# Patient Record
Sex: MALE | Race: WHITE | Employment: OTHER | URBAN - NONMETROPOLITAN AREA
[De-identification: names, ages, dates, MRNs, and addresses within clinical notes are randomized per-mention and may not be internally consistent; named-entity substitution may affect disease eponyms.]

---

## 2017-03-06 ENCOUNTER — OFFICE VISIT (OUTPATIENT)
Dept: FAMILY MEDICINE CLINIC | Age: 67
End: 2017-03-06

## 2017-03-06 VITALS
DIASTOLIC BLOOD PRESSURE: 80 MMHG | HEART RATE: 63 BPM | RESPIRATION RATE: 18 BRPM | WEIGHT: 186.2 LBS | BODY MASS INDEX: 25.97 KG/M2 | OXYGEN SATURATION: 92 % | SYSTOLIC BLOOD PRESSURE: 112 MMHG

## 2017-03-06 DIAGNOSIS — M16.12 PRIMARY OSTEOARTHRITIS OF LEFT HIP: Primary | ICD-10-CM

## 2017-03-06 DIAGNOSIS — Z01.818 PRE-OPERATIVE CLEARANCE: ICD-10-CM

## 2017-03-06 DIAGNOSIS — R93.89 ABNORMAL CHEST X-RAY: ICD-10-CM

## 2017-03-06 PROCEDURE — 99214 OFFICE O/P EST MOD 30 MIN: CPT | Performed by: FAMILY MEDICINE

## 2017-03-06 PROCEDURE — 1036F TOBACCO NON-USER: CPT | Performed by: FAMILY MEDICINE

## 2017-03-06 PROCEDURE — 1123F ACP DISCUSS/DSCN MKR DOCD: CPT | Performed by: FAMILY MEDICINE

## 2017-03-06 PROCEDURE — G8427 DOCREV CUR MEDS BY ELIG CLIN: HCPCS | Performed by: FAMILY MEDICINE

## 2017-03-06 PROCEDURE — G8420 CALC BMI NORM PARAMETERS: HCPCS | Performed by: FAMILY MEDICINE

## 2017-03-06 PROCEDURE — G8484 FLU IMMUNIZE NO ADMIN: HCPCS | Performed by: FAMILY MEDICINE

## 2017-03-06 PROCEDURE — 4040F PNEUMOC VAC/ADMIN/RCVD: CPT | Performed by: FAMILY MEDICINE

## 2017-03-06 PROCEDURE — 3017F COLORECTAL CA SCREEN DOC REV: CPT | Performed by: FAMILY MEDICINE

## 2017-03-06 ASSESSMENT — ENCOUNTER SYMPTOMS
ABDOMINAL PAIN: 0
CONSTIPATION: 0
COUGH: 0
NAUSEA: 0
EYE DISCHARGE: 0
RHINORRHEA: 0
SHORTNESS OF BREATH: 0
DIARRHEA: 0
WHEEZING: 0
SORE THROAT: 0

## 2018-04-10 ENCOUNTER — OFFICE VISIT (OUTPATIENT)
Dept: FAMILY MEDICINE CLINIC | Age: 68
End: 2018-04-10
Payer: MEDICARE

## 2018-04-10 VITALS
BODY MASS INDEX: 26.4 KG/M2 | SYSTOLIC BLOOD PRESSURE: 120 MMHG | HEART RATE: 79 BPM | OXYGEN SATURATION: 97 % | WEIGHT: 188.6 LBS | HEIGHT: 71 IN | DIASTOLIC BLOOD PRESSURE: 76 MMHG

## 2018-04-10 DIAGNOSIS — M15.9 PRIMARY OSTEOARTHRITIS INVOLVING MULTIPLE JOINTS: Primary | ICD-10-CM

## 2018-04-10 DIAGNOSIS — Z23 NEED FOR VACCINATION: ICD-10-CM

## 2018-04-10 PROCEDURE — 1036F TOBACCO NON-USER: CPT | Performed by: FAMILY MEDICINE

## 2018-04-10 PROCEDURE — 3288F FALL RISK ASSESSMENT DOCD: CPT | Performed by: FAMILY MEDICINE

## 2018-04-10 PROCEDURE — G0009 ADMIN PNEUMOCOCCAL VACCINE: HCPCS | Performed by: FAMILY MEDICINE

## 2018-04-10 PROCEDURE — 90670 PCV13 VACCINE IM: CPT | Performed by: FAMILY MEDICINE

## 2018-04-10 PROCEDURE — 1123F ACP DISCUSS/DSCN MKR DOCD: CPT | Performed by: FAMILY MEDICINE

## 2018-04-10 PROCEDURE — 3017F COLORECTAL CA SCREEN DOC REV: CPT | Performed by: FAMILY MEDICINE

## 2018-04-10 PROCEDURE — G8510 SCR DEP NEG, NO PLAN REQD: HCPCS | Performed by: FAMILY MEDICINE

## 2018-04-10 PROCEDURE — 99214 OFFICE O/P EST MOD 30 MIN: CPT | Performed by: FAMILY MEDICINE

## 2018-04-10 PROCEDURE — G8427 DOCREV CUR MEDS BY ELIG CLIN: HCPCS | Performed by: FAMILY MEDICINE

## 2018-04-10 PROCEDURE — 4040F PNEUMOC VAC/ADMIN/RCVD: CPT | Performed by: FAMILY MEDICINE

## 2018-04-10 PROCEDURE — G8419 CALC BMI OUT NRM PARAM NOF/U: HCPCS | Performed by: FAMILY MEDICINE

## 2018-04-10 RX ORDER — CELECOXIB 200 MG/1
200 CAPSULE ORAL 2 TIMES DAILY
Qty: 180 CAPSULE | Refills: 3 | Status: SHIPPED | OUTPATIENT
Start: 2018-04-10 | End: 2018-04-16

## 2018-04-10 ASSESSMENT — ENCOUNTER SYMPTOMS
SORE THROAT: 0
ABDOMINAL PAIN: 0
SHORTNESS OF BREATH: 0
EYE DISCHARGE: 0
COUGH: 0
NAUSEA: 0
CONSTIPATION: 0
DIARRHEA: 0
RHINORRHEA: 0
WHEEZING: 0

## 2018-04-10 ASSESSMENT — PATIENT HEALTH QUESTIONNAIRE - PHQ9
SUM OF ALL RESPONSES TO PHQ QUESTIONS 1-9: 0
1. LITTLE INTEREST OR PLEASURE IN DOING THINGS: 0
SUM OF ALL RESPONSES TO PHQ9 QUESTIONS 1 & 2: 0

## 2018-04-16 ENCOUNTER — TELEPHONE (OUTPATIENT)
Dept: FAMILY MEDICINE CLINIC | Age: 68
End: 2018-04-16

## 2018-04-16 RX ORDER — MELOXICAM 15 MG/1
15 TABLET ORAL DAILY
Qty: 30 TABLET | Refills: 3 | Status: SHIPPED | OUTPATIENT
Start: 2018-04-16 | End: 2018-08-09 | Stop reason: SDUPTHER

## 2018-08-09 RX ORDER — MELOXICAM 15 MG/1
15 TABLET ORAL DAILY
Qty: 30 TABLET | Refills: 3 | Status: SHIPPED | OUTPATIENT
Start: 2018-08-09 | End: 2018-12-06 | Stop reason: SDUPTHER

## 2018-08-09 NOTE — TELEPHONE ENCOUNTER
Vanessa Vines called requesting a refill on the following medications:  Requested Prescriptions     Pending Prescriptions Disp Refills    meloxicam (MOBIC) 15 MG tablet 30 tablet 3     Sig: Take 1 tablet by mouth daily     Pharm[facility]: [facility]      Date of last visit: 4/10/18  Date of next visit (if applicable): Visit date not found    Asking for a 80 day supply please

## 2018-12-06 RX ORDER — MELOXICAM 15 MG/1
15 TABLET ORAL DAILY
Qty: 90 TABLET | Refills: 3 | Status: SHIPPED | OUTPATIENT
Start: 2018-12-06 | End: 2019-12-12 | Stop reason: SDUPTHER

## 2019-06-13 ENCOUNTER — OFFICE VISIT (OUTPATIENT)
Dept: FAMILY MEDICINE CLINIC | Age: 69
End: 2019-06-13
Payer: MEDICARE

## 2019-06-13 VITALS
RESPIRATION RATE: 16 BRPM | BODY MASS INDEX: 25.48 KG/M2 | HEART RATE: 61 BPM | SYSTOLIC BLOOD PRESSURE: 130 MMHG | OXYGEN SATURATION: 97 % | DIASTOLIC BLOOD PRESSURE: 80 MMHG | HEIGHT: 70 IN | WEIGHT: 178 LBS

## 2019-06-13 DIAGNOSIS — M25.511 ACUTE PAIN OF RIGHT SHOULDER: Primary | ICD-10-CM

## 2019-06-13 PROCEDURE — 3017F COLORECTAL CA SCREEN DOC REV: CPT | Performed by: NURSE PRACTITIONER

## 2019-06-13 PROCEDURE — 4040F PNEUMOC VAC/ADMIN/RCVD: CPT | Performed by: NURSE PRACTITIONER

## 2019-06-13 PROCEDURE — G8419 CALC BMI OUT NRM PARAM NOF/U: HCPCS | Performed by: NURSE PRACTITIONER

## 2019-06-13 PROCEDURE — 1036F TOBACCO NON-USER: CPT | Performed by: NURSE PRACTITIONER

## 2019-06-13 PROCEDURE — G8427 DOCREV CUR MEDS BY ELIG CLIN: HCPCS | Performed by: NURSE PRACTITIONER

## 2019-06-13 PROCEDURE — 99213 OFFICE O/P EST LOW 20 MIN: CPT | Performed by: NURSE PRACTITIONER

## 2019-06-13 PROCEDURE — 1123F ACP DISCUSS/DSCN MKR DOCD: CPT | Performed by: NURSE PRACTITIONER

## 2019-06-13 ASSESSMENT — ENCOUNTER SYMPTOMS
BACK PAIN: 0
COLOR CHANGE: 0
SHORTNESS OF BREATH: 0

## 2019-06-13 ASSESSMENT — PATIENT HEALTH QUESTIONNAIRE - PHQ9
2. FEELING DOWN, DEPRESSED OR HOPELESS: 0
1. LITTLE INTEREST OR PLEASURE IN DOING THINGS: 0
SUM OF ALL RESPONSES TO PHQ QUESTIONS 1-9: 0
SUM OF ALL RESPONSES TO PHQ9 QUESTIONS 1 & 2: 0
SUM OF ALL RESPONSES TO PHQ QUESTIONS 1-9: 0

## 2019-06-13 NOTE — PROGRESS NOTES
[facility]  [address] [place] [address]  Dept: [phone]  Dept Fax: [phone]  Loc: [phone]    Amanda Johnson is a 77 y.o. male who presents today for his medical conditions/complaints as noted below. Chief Complaint   Patient presents with    Shoulder Pain     RIGHT-patient states that he was leaning on his shoulder and reached down to get something and felt a pop on Sunday 06/09/19           HPI:     This is 71-year-old male who presents with right should pain x 5 days. Patient was putting pressure on her right shoulder and bend over, he heard a pop in his right shoulder. Pain is worse when he raises his arm. Pain is about a 6-7/10 when raising his arm or when trying to lift heavy items. Pain is absent with rest. No treatment tried. Pain does not radiate. Patient had a rotator cuff surgery on his rotator cuff years ago. Otherwise patient feels well with no other complaints. Current Outpatient Medications   Medication Sig Dispense Refill    meloxicam (MOBIC) 15 MG tablet Take 1 tablet by mouth daily 90 tablet 3     No current facility-administered medications for this visit. No Known Allergies    Subjective:      Review of Systems   Constitutional: Negative for activity change, appetite change, chills, diaphoresis, fatigue, fever and unexpected weight change. HENT: Negative for congestion, ear pain, postnasal drip, sinus pressure and sore throat. Eyes: Negative for visual disturbance. Respiratory: Negative for cough, chest tightness, shortness of breath, wheezing and stridor. Cardiovascular: Negative for chest pain, palpitations and leg swelling. Gastrointestinal: Negative for abdominal distention, abdominal pain, constipation, diarrhea, nausea and vomiting. Endocrine: Negative for polydipsia, polyphagia and polyuria.    Genitourinary: Negative for decreased urine volume, difficulty Standing Expiration Date:   6/13/2020     No orders of the defined types were placed in this encounter.           Electronically signed by CLARISSA Bowen CNP on 6/13/2019 at 2:57 PM

## 2019-06-14 ENCOUNTER — HOSPITAL ENCOUNTER (OUTPATIENT)
Dept: MRI IMAGING | Age: 69
Discharge: HOME | End: 2019-06-14
Payer: MEDICARE

## 2019-06-14 DIAGNOSIS — M25.511 ACUTE PAIN OF RIGHT SHOULDER: ICD-10-CM

## 2019-06-14 PROCEDURE — 73221 MRI JOINT UPR EXTREM W/O DYE: CPT

## 2019-06-16 ASSESSMENT — ENCOUNTER SYMPTOMS
STRIDOR: 0
COUGH: 0
DIARRHEA: 0
CONSTIPATION: 0
WHEEZING: 0
VOMITING: 0
NAUSEA: 0
SORE THROAT: 0
CHEST TIGHTNESS: 0
ABDOMINAL PAIN: 0
SINUS PRESSURE: 0
ABDOMINAL DISTENTION: 0

## 2019-06-17 DIAGNOSIS — S43.431A TEAR OF RIGHT GLENOID LABRUM, INITIAL ENCOUNTER: ICD-10-CM

## 2019-06-17 DIAGNOSIS — S46.011A TRAUMATIC COMPLETE TEAR OF RIGHT ROTATOR CUFF, INITIAL ENCOUNTER: Primary | ICD-10-CM

## 2019-12-12 ENCOUNTER — TELEPHONE (OUTPATIENT)
Dept: FAMILY MEDICINE CLINIC | Age: 69
End: 2019-12-12

## 2019-12-12 ENCOUNTER — OFFICE VISIT (OUTPATIENT)
Dept: FAMILY MEDICINE CLINIC | Age: 69
End: 2019-12-12
Payer: MEDICARE

## 2019-12-12 VITALS
BODY MASS INDEX: 25.34 KG/M2 | HEIGHT: 71 IN | RESPIRATION RATE: 16 BRPM | OXYGEN SATURATION: 97 % | SYSTOLIC BLOOD PRESSURE: 122 MMHG | WEIGHT: 181 LBS | DIASTOLIC BLOOD PRESSURE: 74 MMHG | HEART RATE: 70 BPM

## 2019-12-12 DIAGNOSIS — Z13.1 SCREENING FOR DIABETES MELLITUS: ICD-10-CM

## 2019-12-12 DIAGNOSIS — Z13.0 SCREENING FOR DEFICIENCY ANEMIA: ICD-10-CM

## 2019-12-12 DIAGNOSIS — M25.512 ACUTE PAIN OF LEFT SHOULDER: ICD-10-CM

## 2019-12-12 DIAGNOSIS — Z00.00 ROUTINE GENERAL MEDICAL EXAMINATION AT A HEALTH CARE FACILITY: Primary | ICD-10-CM

## 2019-12-12 DIAGNOSIS — Z13.220 SCREENING FOR LIPID DISORDERS: ICD-10-CM

## 2019-12-12 PROCEDURE — 1123F ACP DISCUSS/DSCN MKR DOCD: CPT | Performed by: NURSE PRACTITIONER

## 2019-12-12 PROCEDURE — 99213 OFFICE O/P EST LOW 20 MIN: CPT | Performed by: NURSE PRACTITIONER

## 2019-12-12 PROCEDURE — G8427 DOCREV CUR MEDS BY ELIG CLIN: HCPCS | Performed by: NURSE PRACTITIONER

## 2019-12-12 PROCEDURE — 4040F PNEUMOC VAC/ADMIN/RCVD: CPT | Performed by: NURSE PRACTITIONER

## 2019-12-12 PROCEDURE — 1036F TOBACCO NON-USER: CPT | Performed by: NURSE PRACTITIONER

## 2019-12-12 PROCEDURE — 3017F COLORECTAL CA SCREEN DOC REV: CPT | Performed by: NURSE PRACTITIONER

## 2019-12-12 PROCEDURE — G8484 FLU IMMUNIZE NO ADMIN: HCPCS | Performed by: NURSE PRACTITIONER

## 2019-12-12 PROCEDURE — G0439 PPPS, SUBSEQ VISIT: HCPCS | Performed by: NURSE PRACTITIONER

## 2019-12-12 PROCEDURE — G8417 CALC BMI ABV UP PARAM F/U: HCPCS | Performed by: NURSE PRACTITIONER

## 2019-12-12 RX ORDER — MELOXICAM 15 MG/1
15 TABLET ORAL DAILY
Qty: 90 TABLET | Refills: 3 | Status: SHIPPED | OUTPATIENT
Start: 2019-12-12 | End: 2020-12-21 | Stop reason: SDUPTHER

## 2019-12-12 SDOH — ECONOMIC STABILITY: INCOME INSECURITY: HOW HARD IS IT FOR YOU TO PAY FOR THE VERY BASICS LIKE FOOD, HOUSING, MEDICAL CARE, AND HEATING?: NOT HARD AT ALL

## 2019-12-12 SDOH — ECONOMIC STABILITY: FOOD INSECURITY: WITHIN THE PAST 12 MONTHS, THE FOOD YOU BOUGHT JUST DIDN'T LAST AND YOU DIDN'T HAVE MONEY TO GET MORE.: NEVER TRUE

## 2019-12-12 SDOH — ECONOMIC STABILITY: TRANSPORTATION INSECURITY
IN THE PAST 12 MONTHS, HAS THE LACK OF TRANSPORTATION KEPT YOU FROM MEDICAL APPOINTMENTS OR FROM GETTING MEDICATIONS?: NO

## 2019-12-12 SDOH — ECONOMIC STABILITY: FOOD INSECURITY: WITHIN THE PAST 12 MONTHS, YOU WORRIED THAT YOUR FOOD WOULD RUN OUT BEFORE YOU GOT MONEY TO BUY MORE.: NEVER TRUE

## 2019-12-12 SDOH — ECONOMIC STABILITY: TRANSPORTATION INSECURITY
IN THE PAST 12 MONTHS, HAS LACK OF TRANSPORTATION KEPT YOU FROM MEETINGS, WORK, OR FROM GETTING THINGS NEEDED FOR DAILY LIVING?: NO

## 2019-12-12 ASSESSMENT — LIFESTYLE VARIABLES
HOW OFTEN DURING THE LAST YEAR HAVE YOU NEEDED AN ALCOHOLIC DRINK FIRST THING IN THE MORNING TO GET YOURSELF GOING AFTER A NIGHT OF HEAVY DRINKING: 0
HOW OFTEN DURING THE LAST YEAR HAVE YOU HAD A FEELING OF GUILT OR REMORSE AFTER DRINKING: 0
HOW MANY STANDARD DRINKS CONTAINING ALCOHOL DO YOU HAVE ON A TYPICAL DAY: 1
HOW OFTEN DO YOU HAVE SIX OR MORE DRINKS ON ONE OCCASION: 2
HOW OFTEN DURING THE LAST YEAR HAVE YOU BEEN UNABLE TO REMEMBER WHAT HAPPENED THE NIGHT BEFORE BECAUSE YOU HAD BEEN DRINKING: 0
AUDIT TOTAL SCORE: 7
HAS A RELATIVE, FRIEND, DOCTOR, OR ANOTHER HEALTH PROFESSIONAL EXPRESSED CONCERN ABOUT YOUR DRINKING OR SUGGESTED YOU CUT DOWN: 0
HOW OFTEN DO YOU HAVE A DRINK CONTAINING ALCOHOL: 4
AUDIT-C TOTAL SCORE: 7
HAVE YOU OR SOMEONE ELSE BEEN INJURED AS A RESULT OF YOUR DRINKING: 0
HOW OFTEN DURING THE LAST YEAR HAVE YOU FOUND THAT YOU WERE NOT ABLE TO STOP DRINKING ONCE YOU HAD STARTED: 0
HOW OFTEN DURING THE LAST YEAR HAVE YOU FAILED TO DO WHAT WAS NORMALLY EXPECTED FROM YOU BECAUSE OF DRINKING: 0

## 2019-12-12 ASSESSMENT — ENCOUNTER SYMPTOMS
CHEST TIGHTNESS: 0
COLOR CHANGE: 0
SHORTNESS OF BREATH: 0
STRIDOR: 0
ABDOMINAL DISTENTION: 0
COUGH: 0
DIARRHEA: 0
CONSTIPATION: 0
VOMITING: 0
BACK PAIN: 0
SORE THROAT: 0
WHEEZING: 0
ABDOMINAL PAIN: 0
NAUSEA: 0
SINUS PRESSURE: 0

## 2019-12-12 ASSESSMENT — PATIENT HEALTH QUESTIONNAIRE - PHQ9
SUM OF ALL RESPONSES TO PHQ QUESTIONS 1-9: 0
SUM OF ALL RESPONSES TO PHQ QUESTIONS 1-9: 0

## 2019-12-16 ENCOUNTER — HOSPITAL ENCOUNTER (OUTPATIENT)
Dept: MRI IMAGING | Age: 69
Discharge: HOME | End: 2019-12-16
Payer: MEDICARE

## 2019-12-16 DIAGNOSIS — S46.012A TRAUMATIC COMPLETE TEAR OF LEFT ROTATOR CUFF, INITIAL ENCOUNTER: Primary | ICD-10-CM

## 2019-12-16 DIAGNOSIS — M25.512 ACUTE PAIN OF LEFT SHOULDER: ICD-10-CM

## 2019-12-16 PROCEDURE — 73221 MRI JOINT UPR EXTREM W/O DYE: CPT

## 2019-12-19 LAB
ABSOLUTE BASO #: 0 X10E9/L (ref 0–0.9)
ABSOLUTE EOS #: 0.1 X10E9/L (ref 0–0.4)
ABSOLUTE LYMPH #: 1.7 X10E9/L (ref 1–3.5)
ABSOLUTE MONO #: 0.3 X10E9/L (ref 0–0.9)
ABSOLUTE NEUT #: 2 X10E9/L (ref 1.5–6.6)
ALBUMIN SERPL-MCNC: 4.5 G/DL (ref 3.2–5.3)
ALK PHOSPHATASE: 68 U/L (ref 39–130)
ALT SERPL-CCNC: 19 U/L (ref 0–40)
ANION GAP SERPL CALCULATED.3IONS-SCNC: 8 MMOL/L (ref 4–12)
AST SERPL-CCNC: 17 U/L (ref 0–41)
BASOPHILS RELATIVE PERCENT: 0.8 %
BILIRUB SERPL-MCNC: 0.5 MG/DL (ref 0.3–1.2)
BUN BLDV-MCNC: 16 MG/DL (ref 5–27)
CALCIUM SERPL-MCNC: 9.4 MG/DL (ref 8.5–10.5)
CHLORIDE BLD-SCNC: 104 MMOL/L (ref 98–109)
CHOLESTEROL/HDL RATIO: 2.8 (ref 1–5)
CHOLESTEROL: 208 MG/DL (ref 150–200)
CO2: 29 MMOL/L (ref 22–32)
CREAT SERPL-MCNC: 0.97 MG/DL (ref 0.6–1.3)
EGFR AFRICAN AMERICAN: >60 ML/MIN/1.73SQ.M
EGFR IF NONAFRICAN AMERICAN: >60 ML/MIN/1.73SQ.M
EOSINOPHILS RELATIVE PERCENT: 3.1 %
GLUCOSE: 88 MG/DL (ref 65–99)
HCT VFR BLD CALC: 43.1 % (ref 37–51)
HDLC SERPL-MCNC: 74 MG/DL
HEMOGLOBIN: 14.5 G/DL (ref 12.6–17.4)
LDL CHOLESTEROL CALCULATED: 123 MG/DL
LDL/HDL RATIO: 1.7
LYMPHOCYTE %: 40.8 %
MCH RBC QN AUTO: 31.4 PG (ref 27–35)
MCHC RBC AUTO-ENTMCNC: 33.7 G/DL (ref 31–36)
MCV RBC AUTO: 93 FL (ref 81–101)
MONOCYTES # BLD: 6.4 %
NEUTROPHILS RELATIVE PERCENT: 48.9 %
PDW BLD-RTO: 12.6 % (ref 11.4–14.3)
PLATELETS: 226 X10E9/L (ref 150–450)
PMV BLD AUTO: 8.4 FL (ref 7–12)
POTASSIUM SERPL-SCNC: 4.4 MMOL/L (ref 3.5–5)
RBC: 4.63 X10E12/L (ref 3.9–5.8)
SODIUM BLD-SCNC: 141 MMOL/L (ref 134–146)
TOTAL PROTEIN: 6.6 G/DL (ref 6–8)
TRIGL SERPL-MCNC: 54 MG/DL (ref 27–150)
VLDLC SERPL CALC-MCNC: 11 MG/DL (ref 0–30)
WBC: 4.1 X10E9/L (ref 4.4–12)

## 2020-01-02 ENCOUNTER — OFFICE VISIT (OUTPATIENT)
Dept: FAMILY MEDICINE CLINIC | Age: 70
End: 2020-01-02
Payer: MEDICARE

## 2020-01-02 VITALS
RESPIRATION RATE: 16 BRPM | HEART RATE: 93 BPM | OXYGEN SATURATION: 96 % | BODY MASS INDEX: 25.27 KG/M2 | WEIGHT: 181.2 LBS | SYSTOLIC BLOOD PRESSURE: 110 MMHG | DIASTOLIC BLOOD PRESSURE: 68 MMHG

## 2020-01-02 PROCEDURE — G8417 CALC BMI ABV UP PARAM F/U: HCPCS | Performed by: NURSE PRACTITIONER

## 2020-01-02 PROCEDURE — 99213 OFFICE O/P EST LOW 20 MIN: CPT | Performed by: NURSE PRACTITIONER

## 2020-01-02 PROCEDURE — G8484 FLU IMMUNIZE NO ADMIN: HCPCS | Performed by: NURSE PRACTITIONER

## 2020-01-02 PROCEDURE — 20610 DRAIN/INJ JOINT/BURSA W/O US: CPT | Performed by: NURSE PRACTITIONER

## 2020-01-02 PROCEDURE — 4040F PNEUMOC VAC/ADMIN/RCVD: CPT | Performed by: NURSE PRACTITIONER

## 2020-01-02 PROCEDURE — 1036F TOBACCO NON-USER: CPT | Performed by: NURSE PRACTITIONER

## 2020-01-02 PROCEDURE — G8427 DOCREV CUR MEDS BY ELIG CLIN: HCPCS | Performed by: NURSE PRACTITIONER

## 2020-01-02 PROCEDURE — 3017F COLORECTAL CA SCREEN DOC REV: CPT | Performed by: NURSE PRACTITIONER

## 2020-01-02 PROCEDURE — 1123F ACP DISCUSS/DSCN MKR DOCD: CPT | Performed by: NURSE PRACTITIONER

## 2020-01-02 RX ORDER — TRIAMCINOLONE ACETONIDE 40 MG/ML
40 INJECTION, SUSPENSION INTRA-ARTICULAR; INTRAMUSCULAR ONCE
Status: COMPLETED | OUTPATIENT
Start: 2020-01-02 | End: 2020-01-02

## 2020-01-02 RX ADMIN — TRIAMCINOLONE ACETONIDE 40 MG: 40 INJECTION, SUSPENSION INTRA-ARTICULAR; INTRAMUSCULAR at 07:53

## 2020-01-02 ASSESSMENT — ENCOUNTER SYMPTOMS
COLOR CHANGE: 0
ABDOMINAL PAIN: 0
CONSTIPATION: 0
DIARRHEA: 0
SHORTNESS OF BREATH: 0
NAUSEA: 0
SINUS PRESSURE: 0
CHEST TIGHTNESS: 0
WHEEZING: 0
SORE THROAT: 0
ABDOMINAL DISTENTION: 0
BACK PAIN: 0
VOMITING: 0
COUGH: 0
STRIDOR: 0

## 2020-01-02 ASSESSMENT — PATIENT HEALTH QUESTIONNAIRE - PHQ9
SUM OF ALL RESPONSES TO PHQ QUESTIONS 1-9: 0
SUM OF ALL RESPONSES TO PHQ9 QUESTIONS 1 & 2: 0
1. LITTLE INTEREST OR PLEASURE IN DOING THINGS: 0
SUM OF ALL RESPONSES TO PHQ QUESTIONS 1-9: 0
2. FEELING DOWN, DEPRESSED OR HOPELESS: 0

## 2020-01-02 NOTE — PROGRESS NOTES
[address] [place] [address]  [address] [place] [address]  Dept: [phone]  Dept Fax: [phone]  Loc: [phone] Carmela Romo is a 72 y.o. male who presents today for his medical conditions/complaints as noted below. Chief Complaint   Patient presents with    Results     MRI results and would like to talk about an injection. surgery cant fix it. would like to control pain somehow           HPI:     HPI    MRI showed tears in left shoulder. States he injured a while ago. mobic helping some maybe. Pain is mild at rest but does increase with certain movements. Would like an injection. Seen surgeon and states can not be fixed. Recommended trying injections and last possible resort a shoulder replacement. History reviewed. No pertinent past medical history. Past Surgical History:   Procedure Laterality Date    ROTATOR CUFF REPAIR Right       and        History reviewed. No pertinent family history. Social History     Tobacco Use    Smoking status: Former Smoker     Packs/day: 0.25     Years: 10.00     Pack years: 2.50     Types: Cigarettes     Last attempt to quit: 11/15/1992     Years since quittin.1    Smokeless tobacco: Never Used   Substance Use Topics    Alcohol use: Yes     Alcohol/week: 20.0 standard drinks     Types: 20 Standard drinks or equivalent per week      Current Outpatient Medications   Medication Sig Dispense Refill    meloxicam (MOBIC) 15 MG tablet Take 1 tablet by mouth daily 90 tablet 3     No current facility-administered medications for this visit.       No Known Allergies    Health Maintenance   Topic Date Due    Hepatitis C screen  1950    DTaP/Tdap/Td vaccine (1 - Tdap) 1962    A1C test (Diabetic or Prediabetic)  2015    Shingles Vaccine (2 of 3) 10/28/2015    Annual Wellness Visit (AWV)  2020    Lipid screen  2024    Colon cancer screen colonoscopy  06/06/2026    Flu vaccine  Completed    Pneumococcal 65+ years Vaccine  Completed    AAA screen  Completed       Subjective:      Review of Systems   Constitutional: Negative for activity change, appetite change, chills, diaphoresis, fatigue, fever and unexpected weight change. HENT: Negative for congestion, ear pain, postnasal drip, sinus pressure and sore throat. Eyes: Negative for visual disturbance. Respiratory: Negative for cough, chest tightness, shortness of breath, wheezing and stridor. Cardiovascular: Negative for chest pain, palpitations and leg swelling. Gastrointestinal: Negative for abdominal distention, abdominal pain, constipation, diarrhea, nausea and vomiting. Endocrine: Negative for polydipsia, polyphagia and polyuria. Genitourinary: Negative for decreased urine volume, difficulty urinating, dysuria, flank pain, frequency, hematuria and urgency. Musculoskeletal: Positive for arthralgias. Negative for back pain, gait problem, joint swelling, myalgias and neck pain. Skin: Negative for color change, pallor and rash. Neurological: Negative for dizziness, syncope, weakness, light-headedness, numbness and headaches. Hematological: Negative for adenopathy. Psychiatric/Behavioral: Negative for behavioral problems, self-injury and sleep disturbance. The patient is not nervous/anxious. Objective:     Physical Exam  Vitals signs and nursing note reviewed. Constitutional:       Appearance: Normal appearance. He is well-developed. HENT:      Head: Normocephalic. Right Ear: Tympanic membrane and external ear normal.      Left Ear: Tympanic membrane and external ear normal.      Nose: Nose normal.      Right Sinus: No maxillary sinus tenderness. Left Sinus: No maxillary sinus tenderness. Mouth/Throat:      Pharynx: Uvula midline. Eyes:      Pupils: Pupils are equal, round, and reactive to light.    Neck:      Musculoskeletal: Normal range of motion and neck supple. Thyroid: No thyromegaly. Vascular: No carotid bruit or JVD. Trachea: Trachea normal.   Cardiovascular:      Rate and Rhythm: Normal rate and regular rhythm. Heart sounds: Normal heart sounds. No murmur. Pulmonary:      Effort: Pulmonary effort is normal. No respiratory distress. Breath sounds: Normal breath sounds. No decreased breath sounds, wheezing, rhonchi or rales. Chest:      Chest wall: No tenderness. Abdominal:      General: Bowel sounds are normal. There is no distension. Palpations: Abdomen is soft. There is no mass. Tenderness: There is no tenderness. Musculoskeletal:         General: Tenderness present. No deformity. Left shoulder: He exhibits decreased range of motion, tenderness and pain. Lymphadenopathy:      Cervical: No cervical adenopathy. Skin:     General: Skin is warm and dry. Findings: No erythema or rash. Neurological:      Mental Status: He is alert and oriented to person, place, and time. Cranial Nerves: No cranial nerve deficit. Sensory: No sensory deficit. Motor: No abnormal muscle tone. Coordination: Coordination normal.      Gait: Gait normal.   Psychiatric:         Behavior: Behavior normal.         Thought Content: Thought content normal.         Judgment: Judgment normal.       /68 (Site: Right Upper Arm, Position: Sitting)   Pulse 93   Resp 16   Wt 181 lb 3.2 oz (82.2 kg)   SpO2 96%   BMI 25.27 kg/m²     Assessment:       Diagnosis Orders   1. Traumatic complete tear of left rotator cuff, initial encounter  triamcinolone acetonide (KENALOG-40) injection 40 mg    [place] ARTHROCENTESIS ASPIR&/INJ MAJOR JT/BURSA W/O US   2. Glenoid labrum tear, left, initial encounter  triamcinolone acetonide (KENALOG-40) injection 40 mg    [place] ARTHROCENTESIS ASPIR&/INJ MAJOR JT/BURSA W/O US   3.  Chronic left shoulder pain  triamcinolone acetonide (KENALOG-40) injection 40 mg    [place] ARTHROCENTESIS

## 2020-02-11 PROBLEM — Z79.1 ENCOUNTER FOR LONG-TERM (CURRENT) USE OF NSAIDS: Status: ACTIVE | Noted: 2020-02-11

## 2020-03-11 ENCOUNTER — PROCEDURE VISIT (OUTPATIENT)
Dept: FAMILY MEDICINE CLINIC | Age: 70
End: 2020-03-11
Payer: MEDICARE

## 2020-03-11 VITALS
DIASTOLIC BLOOD PRESSURE: 78 MMHG | OXYGEN SATURATION: 98 % | HEART RATE: 66 BPM | BODY MASS INDEX: 25.57 KG/M2 | WEIGHT: 183.3 LBS | SYSTOLIC BLOOD PRESSURE: 132 MMHG | RESPIRATION RATE: 16 BRPM

## 2020-03-11 PROCEDURE — 99213 OFFICE O/P EST LOW 20 MIN: CPT | Performed by: NURSE PRACTITIONER

## 2020-03-11 PROCEDURE — G8427 DOCREV CUR MEDS BY ELIG CLIN: HCPCS | Performed by: NURSE PRACTITIONER

## 2020-03-11 PROCEDURE — G8484 FLU IMMUNIZE NO ADMIN: HCPCS | Performed by: NURSE PRACTITIONER

## 2020-03-11 PROCEDURE — G8417 CALC BMI ABV UP PARAM F/U: HCPCS | Performed by: NURSE PRACTITIONER

## 2020-03-11 PROCEDURE — 3017F COLORECTAL CA SCREEN DOC REV: CPT | Performed by: NURSE PRACTITIONER

## 2020-03-11 PROCEDURE — 20610 DRAIN/INJ JOINT/BURSA W/O US: CPT | Performed by: NURSE PRACTITIONER

## 2020-03-11 PROCEDURE — 1123F ACP DISCUSS/DSCN MKR DOCD: CPT | Performed by: NURSE PRACTITIONER

## 2020-03-11 PROCEDURE — 4040F PNEUMOC VAC/ADMIN/RCVD: CPT | Performed by: NURSE PRACTITIONER

## 2020-03-11 PROCEDURE — 1036F TOBACCO NON-USER: CPT | Performed by: NURSE PRACTITIONER

## 2020-03-11 RX ORDER — TRIAMCINOLONE ACETONIDE 40 MG/ML
40 INJECTION, SUSPENSION INTRA-ARTICULAR; INTRAMUSCULAR ONCE
Status: COMPLETED | OUTPATIENT
Start: 2020-03-11 | End: 2020-03-11

## 2020-03-11 RX ADMIN — TRIAMCINOLONE ACETONIDE 40 MG: 40 INJECTION, SUSPENSION INTRA-ARTICULAR; INTRAMUSCULAR at 16:42

## 2020-03-11 NOTE — PROGRESS NOTES
vaccine  Aged Out       Subjective:      Review of Systems   Constitutional: Negative for activity change, appetite change, chills, diaphoresis, fatigue, fever and unexpected weight change. HENT: Negative for congestion, ear pain, postnasal drip, sinus pressure and sore throat. Eyes: Negative for visual disturbance. Respiratory: Negative for cough, chest tightness, shortness of breath, wheezing and stridor. Cardiovascular: Negative for chest pain, palpitations and leg swelling. Gastrointestinal: Negative for abdominal distention, abdominal pain, constipation, diarrhea, nausea and vomiting. Endocrine: Negative for polydipsia, polyphagia and polyuria. Genitourinary: Negative for decreased urine volume, difficulty urinating, dysuria, flank pain, frequency, hematuria and urgency. Musculoskeletal: Positive for arthralgias. Negative for back pain, gait problem, joint swelling, myalgias and neck pain. Skin: Negative for color change, pallor and rash. Neurological: Negative for dizziness, syncope, weakness, light-headedness, numbness and headaches. Hematological: Negative for adenopathy. Psychiatric/Behavioral: Negative for behavioral problems, self-injury and sleep disturbance. The patient is not nervous/anxious. Objective:     Physical Exam  Vitals signs and nursing note reviewed. Constitutional:       Appearance: Normal appearance. He is well-developed. HENT:      Head: Normocephalic. Right Ear: Tympanic membrane and external ear normal.      Left Ear: Tympanic membrane and external ear normal.      Nose: Nose normal.      Right Sinus: No maxillary sinus tenderness. Left Sinus: No maxillary sinus tenderness. Mouth/Throat:      Pharynx: Uvula midline. Eyes:      Pupils: Pupils are equal, round, and reactive to light. Neck:      Musculoskeletal: Normal range of motion and neck supple. Thyroid: No thyromegaly. Vascular: No carotid bruit or JVD.       Trachea: Trachea normal.   Cardiovascular:      Rate and Rhythm: Normal rate and regular rhythm. Heart sounds: Normal heart sounds. No murmur. Pulmonary:      Effort: Pulmonary effort is normal. No respiratory distress. Breath sounds: Normal breath sounds. No decreased breath sounds, wheezing, rhonchi or rales. Chest:      Chest wall: No tenderness. Abdominal:      General: Bowel sounds are normal. There is no distension. Palpations: Abdomen is soft. There is no mass. Tenderness: There is no abdominal tenderness. Musculoskeletal: Normal range of motion. General: Tenderness present. No swelling, deformity or signs of injury. Right shoulder: He exhibits tenderness and pain. He exhibits normal range of motion and no laceration. Lymphadenopathy:      Cervical: No cervical adenopathy. Skin:     General: Skin is warm and dry. Findings: No erythema or rash. Neurological:      Mental Status: He is alert and oriented to person, place, and time. Cranial Nerves: No cranial nerve deficit. Sensory: No sensory deficit. Motor: No abnormal muscle tone. Coordination: Coordination normal.      Gait: Gait normal.   Psychiatric:         Behavior: Behavior normal.         Thought Content: Thought content normal.         Judgment: Judgment normal.       /78 (Site: Left Upper Arm, Position: Sitting, Cuff Size: Medium Adult)   Pulse 66   Resp 16   Wt 183 lb 4.8 oz (83.1 kg)   SpO2 98%   BMI 25.57 kg/m²     Assessment:       Diagnosis Orders   1. Acute pain of right shoulder  triamcinolone acetonide (KENALOG-40) injection 40 mg    WA ARTHROCENTESIS ASPIR&/INJ MAJOR JT/BURSA W/O US   2. Complete tear of right rotator cuff, unspecified whether traumatic  triamcinolone acetonide (KENALOG-40) injection 40 mg    WA ARTHROCENTESIS ASPIR&/INJ MAJOR JT/BURSA W/O US       Plan:     After consent was obtained, using sterile technique the right shoulder was prepped.   Ethyl chloride spray was used anesthetize the needle tract into the joint from the posterior approach. The right shoulder joint was entered. Steroid Kenalog 40 mg and 1 ml plain Lidocaine was then injected and the needle withdrawn. The procedure was well tolerated. The patient is asked to continue to rest the shoulder for a few more days before resuming regular activities. It may be more painful for the first 1-2 days. Watch for fever, or increased swelling or persistent pain in knee. Call or return to clinic prn if such symptoms occur or the knee fails to improve as anticipated. Discussed use, benefit, and side effects of prescribed medications. Barriers tomedication compliance addressed. All patient questions answered. Pt voiced understanding. No follow-ups on file. Orders Placed This Encounter   Procedures    [place] ARTHROCENTESIS ASPIR&/INJ MAJOR JT/BURSA W/O US     Orders Placed This Encounter   Medications    triamcinolone acetonide (KENALOG-40) injection 40 mg        Reccommended tobacco cessation options including pharmacologicmethods, counseled great than 3 minutes during this visit:  Yes  []  No  []     Patient given educational materials - see patient instructions. Discussed use, benefit, and sideeffects of prescribed medications. All patient questions answered. Pt voiced understanding. Reviewed health maintenance. Instructed to continue current medications, diet and exercise. Patient agreed with treatment plan. Follow up as directed.      Electronically signed by CLARISSA Christian CNP on 3/12/2020 at 7:57 AM

## 2020-03-12 ASSESSMENT — ENCOUNTER SYMPTOMS
DIARRHEA: 0
VOMITING: 0
SHORTNESS OF BREATH: 0
ABDOMINAL PAIN: 0
ABDOMINAL DISTENTION: 0
SORE THROAT: 0
STRIDOR: 0
COLOR CHANGE: 0
CONSTIPATION: 0
NAUSEA: 0
BACK PAIN: 0
COUGH: 0
CHEST TIGHTNESS: 0
WHEEZING: 0
SINUS PRESSURE: 0

## 2020-04-09 ENCOUNTER — TELEPHONE (OUTPATIENT)
Dept: FAMILY MEDICINE CLINIC | Age: 70
End: 2020-04-09

## 2020-04-09 NOTE — TELEPHONE ENCOUNTER
Called spoke to the pt he is gonna try to call [facility] he saw someone out there for his shoulder before.

## 2020-08-13 ENCOUNTER — PROCEDURE VISIT (OUTPATIENT)
Dept: FAMILY MEDICINE CLINIC | Age: 70
End: 2020-08-13
Payer: MEDICARE

## 2020-08-13 VITALS
WEIGHT: 177 LBS | TEMPERATURE: 97.5 F | DIASTOLIC BLOOD PRESSURE: 76 MMHG | HEART RATE: 76 BPM | BODY MASS INDEX: 24.69 KG/M2 | SYSTOLIC BLOOD PRESSURE: 112 MMHG | OXYGEN SATURATION: 98 %

## 2020-08-13 PROCEDURE — 3017F COLORECTAL CA SCREEN DOC REV: CPT | Performed by: NURSE PRACTITIONER

## 2020-08-13 PROCEDURE — 1123F ACP DISCUSS/DSCN MKR DOCD: CPT | Performed by: NURSE PRACTITIONER

## 2020-08-13 PROCEDURE — 20610 DRAIN/INJ JOINT/BURSA W/O US: CPT | Performed by: NURSE PRACTITIONER

## 2020-08-13 PROCEDURE — 1036F TOBACCO NON-USER: CPT | Performed by: NURSE PRACTITIONER

## 2020-08-13 PROCEDURE — G8420 CALC BMI NORM PARAMETERS: HCPCS | Performed by: NURSE PRACTITIONER

## 2020-08-13 PROCEDURE — G8427 DOCREV CUR MEDS BY ELIG CLIN: HCPCS | Performed by: NURSE PRACTITIONER

## 2020-08-13 PROCEDURE — 99213 OFFICE O/P EST LOW 20 MIN: CPT | Performed by: NURSE PRACTITIONER

## 2020-08-13 PROCEDURE — 4040F PNEUMOC VAC/ADMIN/RCVD: CPT | Performed by: NURSE PRACTITIONER

## 2020-08-13 RX ORDER — TRIAMCINOLONE ACETONIDE 40 MG/ML
40 INJECTION, SUSPENSION INTRA-ARTICULAR; INTRAMUSCULAR ONCE
Status: COMPLETED | OUTPATIENT
Start: 2020-08-13 | End: 2020-08-13

## 2020-08-13 RX ADMIN — TRIAMCINOLONE ACETONIDE 40 MG: 40 INJECTION, SUSPENSION INTRA-ARTICULAR; INTRAMUSCULAR at 14:40

## 2020-08-17 ASSESSMENT — ENCOUNTER SYMPTOMS
SHORTNESS OF BREATH: 0
CONSTIPATION: 0
SORE THROAT: 0
NAUSEA: 0
CHEST TIGHTNESS: 0
ABDOMINAL PAIN: 0
COLOR CHANGE: 0
SINUS PRESSURE: 0
COUGH: 0
VOMITING: 0
STRIDOR: 0
DIARRHEA: 0
ABDOMINAL DISTENTION: 0
WHEEZING: 0
BACK PAIN: 0

## 2020-12-21 ENCOUNTER — PROCEDURE VISIT (OUTPATIENT)
Dept: FAMILY MEDICINE CLINIC | Age: 70
End: 2020-12-21
Payer: MEDICARE

## 2020-12-21 VITALS
OXYGEN SATURATION: 98 % | RESPIRATION RATE: 18 BRPM | DIASTOLIC BLOOD PRESSURE: 78 MMHG | BODY MASS INDEX: 25.13 KG/M2 | SYSTOLIC BLOOD PRESSURE: 124 MMHG | HEART RATE: 80 BPM | TEMPERATURE: 97.2 F | WEIGHT: 180.2 LBS

## 2020-12-21 PROCEDURE — 4040F PNEUMOC VAC/ADMIN/RCVD: CPT | Performed by: NURSE PRACTITIONER

## 2020-12-21 PROCEDURE — G8427 DOCREV CUR MEDS BY ELIG CLIN: HCPCS | Performed by: NURSE PRACTITIONER

## 2020-12-21 PROCEDURE — 1036F TOBACCO NON-USER: CPT | Performed by: NURSE PRACTITIONER

## 2020-12-21 PROCEDURE — 3017F COLORECTAL CA SCREEN DOC REV: CPT | Performed by: NURSE PRACTITIONER

## 2020-12-21 PROCEDURE — G8484 FLU IMMUNIZE NO ADMIN: HCPCS | Performed by: NURSE PRACTITIONER

## 2020-12-21 PROCEDURE — 99213 OFFICE O/P EST LOW 20 MIN: CPT | Performed by: NURSE PRACTITIONER

## 2020-12-21 PROCEDURE — 20610 DRAIN/INJ JOINT/BURSA W/O US: CPT | Performed by: NURSE PRACTITIONER

## 2020-12-21 PROCEDURE — G8417 CALC BMI ABV UP PARAM F/U: HCPCS | Performed by: NURSE PRACTITIONER

## 2020-12-21 PROCEDURE — 1123F ACP DISCUSS/DSCN MKR DOCD: CPT | Performed by: NURSE PRACTITIONER

## 2020-12-21 RX ORDER — TRIAMCINOLONE ACETONIDE 40 MG/ML
40 INJECTION, SUSPENSION INTRA-ARTICULAR; INTRAMUSCULAR ONCE
Status: COMPLETED | OUTPATIENT
Start: 2020-12-21 | End: 2020-12-21

## 2020-12-21 RX ORDER — MELOXICAM 15 MG/1
15 TABLET ORAL DAILY
Qty: 90 TABLET | Refills: 3 | Status: SHIPPED | OUTPATIENT
Start: 2020-12-21 | Stop reason: SDUPTHER

## 2020-12-21 RX ADMIN — TRIAMCINOLONE ACETONIDE 40 MG: 40 INJECTION, SUSPENSION INTRA-ARTICULAR; INTRAMUSCULAR at 15:21

## 2020-12-21 NOTE — PROGRESS NOTES
[address] [place] [address] [address] [place] [address]  Dept: [phone]  Dept Fax: [phone]  Loc: [phone] Carmela Wong is a 72 y.o. male who presents today for his medical conditions/complaints as noted below. Chief Complaint   Patient presents with    Shoulder Pain     right shoulder injection           HPI:     HPI    Right shoulder pain. Chronic arthritic pain. On going. Does get periodic joint injections in right shoulder. Last one was 6 months ago. No past medical history on file. Past Surgical History:   Procedure Laterality Date    ROTATOR CUFF REPAIR Right       and        No family history on file. Social History     Tobacco Use    Smoking status: Former Smoker     Packs/day: 0.25     Years: 10.00     Pack years: 2.50     Types: Cigarettes     Quit date: 11/15/1992     Years since quittin.1    Smokeless tobacco: Never Used   Substance Use Topics    Alcohol use: Yes     Alcohol/week: 20.0 standard drinks     Types: 20 Standard drinks or equivalent per week      Current Outpatient Medications   Medication Sig Dispense Refill    meloxicam (MOBIC) 15 MG tablet Take 1 tablet by mouth daily 90 tablet 3     No current facility-administered medications for this visit.       No Known Allergies    Health Maintenance   Topic Date Due    Hepatitis C screen  1950    DTaP/Tdap/Td vaccine (1 - Tdap) 1970    Shingles Vaccine (1 of 2) 2001    A1C test (Diabetic or Prediabetic)  2015    Annual Wellness Visit (AWV)  2019    Flu vaccine (1) 2020    Lipid screen  2024    Colon cancer screen colonoscopy  2026    Pneumococcal 65+ years Vaccine  Completed    AAA screen  Completed    Hepatitis A vaccine  Aged Out    Hepatitis B vaccine  Aged Out    Hib vaccine  Aged Out    Meningococcal (ACWY) vaccine  Aged Out       Subjective: Review of Systems   Constitutional: Negative for chills, fatigue and fever. Respiratory: Negative for shortness of breath. Cardiovascular: Negative for chest pain. Gastrointestinal: Negative for diarrhea, nausea and vomiting. Musculoskeletal: Positive for arthralgias. Skin: Negative for color change and rash. Neurological: Negative for dizziness, light-headedness and headaches. Objective:     Physical Exam  Constitutional:       Appearance: Normal appearance. HENT:      Head: Normocephalic and atraumatic. Cardiovascular:      Rate and Rhythm: Normal rate. Pulmonary:      Effort: Pulmonary effort is normal.   Musculoskeletal:         General: Tenderness present. No swelling, deformity or signs of injury. Right shoulder: He exhibits tenderness, crepitus and pain. He exhibits no swelling and no effusion. Skin:     General: Skin is warm and dry. Neurological:      Mental Status: He is alert. /78 (Site: Right Upper Arm, Position: Sitting, Cuff Size: Medium Adult)   Pulse 80   Temp 97.2 °F (36.2 °C) (Temporal)   Resp 18   Wt 180 lb 3.2 oz (81.7 kg)   SpO2 98%   BMI 25.13 kg/m²     Assessment:       Diagnosis Orders   1.  Chronic right shoulder pain  [place] ARTHROCENTESIS ASPIR&/INJ MAJOR JT/BURSA W/O US    triamcinolone acetonide (KENALOG-40) injection 40 mg       Plan:     mobic for pain    Right shoulder injection After consent was obtained, using sterile technique the right shoulder was prepped and ethyl chloride spray was used to anesthetize the needle tract into the joint from the posterior approach. The shoulder joint was entered without problems. Steroid 40 mg and 1 ml plain Lidocaine was then injected and the needle withdrawn. The procedure was well tolerated. The patient is asked to continue to rest the shoulder for a few more days before resuming regular activities. It may be more painful for the first 1-2 days. Watch for fever, or increased swelling or persistent pain in knee. Call or return to clinic prn if such symptoms occur or the shoulder fails to improve as anticipated. Discussed use, benefit, and side effects of prescribed medications. Barriers tomedication compliance addressed. All patient questions answered. Pt voiced understanding. No follow-ups on file. Orders Placed This Encounter   Procedures    [place] ARTHROCENTESIS ASPIR&/INJ MAJOR JT/BURSA W/O US     Orders Placed This Encounter   Medications    meloxicam (MOBIC) 15 MG tablet     Sig: Take 1 tablet by mouth daily     Dispense:  90 tablet     Refill:  3    triamcinolone acetonide (KENALOG-40) injection 40 mg           Patient given educational materials - see patient instructions. Discussed use, benefit, and sideeffects of prescribed medications. All patient questions answered. Pt voiced understanding. Reviewed health maintenance. Instructed to continue current medications, diet and exercise. Patient agreed with treatment plan. Follow up as directed.      Electronically signed by CLARISSA Ratliff CNP on 12/21/2020 at 3:23 PM

## 2020-12-21 NOTE — TELEPHONE ENCOUNTER
Ely Adhikari called requesting a refill on the following medications:  Requested Prescriptions     Pending Prescriptions Disp Refills    meloxicam (MOBIC) 15 MG tablet 90 tablet 3     Sig: Take 1 tablet by mouth daily     [email or web address]      Date of last visit: 12/21/20  Date of next visit (if applicable): Visit date not found

## 2020-12-22 RX ORDER — MELOXICAM 15 MG/1
15 TABLET ORAL DAILY
Qty: 90 TABLET | Refills: 3 | Status: SHIPPED | OUTPATIENT
Start: 2020-12-22 | End: 2021-12-14 | Stop reason: SDUPTHER

## 2020-12-23 ASSESSMENT — ENCOUNTER SYMPTOMS
DIARRHEA: 0
SHORTNESS OF BREATH: 0
NAUSEA: 0
VOMITING: 0
COLOR CHANGE: 0

## 2021-04-13 ENCOUNTER — PROCEDURE VISIT (OUTPATIENT)
Dept: FAMILY MEDICINE CLINIC | Age: 71
End: 2021-04-13
Payer: MEDICARE

## 2021-04-13 VITALS
WEIGHT: 183.6 LBS | BODY MASS INDEX: 25.61 KG/M2 | RESPIRATION RATE: 18 BRPM | OXYGEN SATURATION: 96 % | TEMPERATURE: 97.7 F | SYSTOLIC BLOOD PRESSURE: 122 MMHG | DIASTOLIC BLOOD PRESSURE: 70 MMHG | HEART RATE: 84 BPM

## 2021-04-13 DIAGNOSIS — G89.29 CHRONIC RIGHT SHOULDER PAIN: Primary | ICD-10-CM

## 2021-04-13 DIAGNOSIS — M25.511 CHRONIC RIGHT SHOULDER PAIN: Primary | ICD-10-CM

## 2021-04-13 DIAGNOSIS — M75.121 COMPLETE TEAR OF RIGHT ROTATOR CUFF, UNSPECIFIED WHETHER TRAUMATIC: ICD-10-CM

## 2021-04-13 PROCEDURE — 1036F TOBACCO NON-USER: CPT | Performed by: NURSE PRACTITIONER

## 2021-04-13 PROCEDURE — 1123F ACP DISCUSS/DSCN MKR DOCD: CPT | Performed by: NURSE PRACTITIONER

## 2021-04-13 PROCEDURE — 4040F PNEUMOC VAC/ADMIN/RCVD: CPT | Performed by: NURSE PRACTITIONER

## 2021-04-13 PROCEDURE — 3017F COLORECTAL CA SCREEN DOC REV: CPT | Performed by: NURSE PRACTITIONER

## 2021-04-13 PROCEDURE — 20610 DRAIN/INJ JOINT/BURSA W/O US: CPT | Performed by: NURSE PRACTITIONER

## 2021-04-13 PROCEDURE — G8417 CALC BMI ABV UP PARAM F/U: HCPCS | Performed by: NURSE PRACTITIONER

## 2021-04-13 PROCEDURE — G8427 DOCREV CUR MEDS BY ELIG CLIN: HCPCS | Performed by: NURSE PRACTITIONER

## 2021-04-13 ASSESSMENT — PATIENT HEALTH QUESTIONNAIRE - PHQ9
1. LITTLE INTEREST OR PLEASURE IN DOING THINGS: 0
SUM OF ALL RESPONSES TO PHQ QUESTIONS 1-9: 0

## 2021-04-14 RX ADMIN — TRIAMCINOLONE ACETONIDE 40 MG: 40 INJECTION, SUSPENSION INTRA-ARTICULAR; INTRAMUSCULAR at 16:15

## 2021-04-17 RX ORDER — TRIAMCINOLONE ACETONIDE 40 MG/ML
40 INJECTION, SUSPENSION INTRA-ARTICULAR; INTRAMUSCULAR ONCE
Status: COMPLETED | OUTPATIENT
Start: 2021-04-17 | End: 2021-04-14

## 2021-04-17 ASSESSMENT — ENCOUNTER SYMPTOMS
SORE THROAT: 0
COUGH: 0
VOMITING: 0
NAUSEA: 0
WHEEZING: 0
BACK PAIN: 0
STRIDOR: 0
ABDOMINAL PAIN: 0
DIARRHEA: 0
COLOR CHANGE: 0
ABDOMINAL DISTENTION: 0
SINUS PRESSURE: 0
CONSTIPATION: 0
CHEST TIGHTNESS: 0
SHORTNESS OF BREATH: 0

## 2021-04-17 NOTE — PROGRESS NOTES
Branden Maldonado (:  1950) is a 80 y.o. male,Established patient, here for evaluation of the following chief complaint(s):  Shoulder Pain (right shoulder injection)      ASSESSMENT/PLAN:  1. Chronic right shoulder pain  -     ME ARTHROCENTESIS ASPIR&/INJ MAJOR JT/BURSA W/O US  -     triamcinolone acetonide (KENALOG-40) injection 40 mg; 40 mg, Intra-articular, ONCE, Sat 21 at 0845, For 1 dose  2. Complete tear of right rotator cuff, unspecified whether traumatic  -     ME ARTHROCENTESIS ASPIR&/INJ MAJOR JT/BURSA W/O US  -     triamcinolone acetonide (KENALOG-40) injection 40 mg; 40 mg, Intra-articular, ONCE, Sat 21 at 0845, For 1 dose    After consent was obtained, using sterile technique the Right shoulder was prepped and Ethyl Chloride was used to anesthetize the needle tract into the joint from the posterior approach. The shoulder joint was entered. Steroid Kenalog mg and 1 ml plain Lidocaine was then injected and the needle withdrawn. The procedure was well tolerated. The patient is asked to continue to rest the shoulder for a few more days before resuming regular activities. It may be more painful for the first 1-2 days. Watch for fever, or increased swelling or persistent pain in shoulder. Call or return to clinic prn if such symptoms occur or the knee fails to improve as anticipated. Return if symptoms worsen or fail to improve. SUBJECTIVE/OBJECTIVE:  HPI    Chronic right shoulder pain. Had MRI and showed rotator cuff tear and arthritis. Seen ortho. Not wanting to get replaced yet. Does come for shoulder injections in past.  States they do help. Review of Systems   Constitutional: Negative for activity change, appetite change, chills, diaphoresis, fatigue, fever and unexpected weight change. HENT: Negative for congestion, ear pain, postnasal drip, sinus pressure and sore throat. Eyes: Negative for visual disturbance.    Respiratory: Negative for cough, chest tightness, shortness of breath, wheezing and stridor. Cardiovascular: Negative for chest pain, palpitations and leg swelling. Gastrointestinal: Negative for abdominal distention, abdominal pain, constipation, diarrhea, nausea and vomiting. Endocrine: Negative for polydipsia, polyphagia and polyuria. Genitourinary: Negative for decreased urine volume, difficulty urinating, dysuria, flank pain, frequency, hematuria and urgency. Musculoskeletal: Positive for arthralgias. Negative for back pain, gait problem, joint swelling, myalgias and neck pain. Skin: Negative for color change, pallor and rash. Neurological: Negative for dizziness, syncope, weakness, light-headedness, numbness and headaches. Hematological: Negative for adenopathy. Psychiatric/Behavioral: Negative for behavioral problems, self-injury and sleep disturbance. The patient is not nervous/anxious. Physical Exam  Constitutional:       General: He is not in acute distress. Appearance: Normal appearance. Neck:      Musculoskeletal: Normal range of motion. Cardiovascular:      Rate and Rhythm: Normal rate. Pulmonary:      Effort: Pulmonary effort is normal.   Musculoskeletal: Normal range of motion. General: Tenderness present. No swelling, deformity or signs of injury. Skin:     General: Skin is warm and dry. Coloration: Skin is not pale. Findings: No rash. Neurological:      Mental Status: He is alert and oriented to person, place, and time. On this date 4/13/2021 I have spent 20 minutes reviewing previous notes, test results and face to face with the patient discussing the diagnosis and importance of compliance with the treatment plan as well as documenting on the day of the visit. An electronic signature was used to authenticate this note.     --CLARISSA Owens - CNP

## 2021-08-19 ENCOUNTER — TELEPHONE (OUTPATIENT)
Dept: FAMILY MEDICINE CLINIC | Age: 71
End: 2021-08-19

## 2021-08-19 NOTE — TELEPHONE ENCOUNTER
Patient scheduled for Right shoulder injection 08/23/21. Just verifying that you agree with this plan?

## 2021-08-23 ENCOUNTER — PROCEDURE VISIT (OUTPATIENT)
Dept: FAMILY MEDICINE CLINIC | Age: 71
End: 2021-08-23
Payer: MEDICARE

## 2021-08-23 VITALS
OXYGEN SATURATION: 97 % | RESPIRATION RATE: 18 BRPM | BODY MASS INDEX: 25.24 KG/M2 | HEART RATE: 65 BPM | SYSTOLIC BLOOD PRESSURE: 118 MMHG | WEIGHT: 181 LBS | DIASTOLIC BLOOD PRESSURE: 76 MMHG

## 2021-08-23 DIAGNOSIS — G89.29 CHRONIC RIGHT SHOULDER PAIN: Primary | ICD-10-CM

## 2021-08-23 DIAGNOSIS — M25.511 CHRONIC RIGHT SHOULDER PAIN: Primary | ICD-10-CM

## 2021-08-23 DIAGNOSIS — M75.121 COMPLETE TEAR OF RIGHT ROTATOR CUFF, UNSPECIFIED WHETHER TRAUMATIC: ICD-10-CM

## 2021-08-23 PROCEDURE — G8417 CALC BMI ABV UP PARAM F/U: HCPCS | Performed by: NURSE PRACTITIONER

## 2021-08-23 PROCEDURE — 99213 OFFICE O/P EST LOW 20 MIN: CPT | Performed by: NURSE PRACTITIONER

## 2021-08-23 PROCEDURE — 1036F TOBACCO NON-USER: CPT | Performed by: NURSE PRACTITIONER

## 2021-08-23 PROCEDURE — G8427 DOCREV CUR MEDS BY ELIG CLIN: HCPCS | Performed by: NURSE PRACTITIONER

## 2021-08-23 PROCEDURE — 20610 DRAIN/INJ JOINT/BURSA W/O US: CPT | Performed by: NURSE PRACTITIONER

## 2021-08-23 PROCEDURE — 4040F PNEUMOC VAC/ADMIN/RCVD: CPT | Performed by: NURSE PRACTITIONER

## 2021-08-23 PROCEDURE — 1123F ACP DISCUSS/DSCN MKR DOCD: CPT | Performed by: NURSE PRACTITIONER

## 2021-08-23 PROCEDURE — 3017F COLORECTAL CA SCREEN DOC REV: CPT | Performed by: NURSE PRACTITIONER

## 2021-08-23 RX ORDER — METHYLPREDNISOLONE ACETATE 80 MG/ML
80 INJECTION, SUSPENSION INTRA-ARTICULAR; INTRALESIONAL; INTRAMUSCULAR; SOFT TISSUE ONCE
Status: COMPLETED | OUTPATIENT
Start: 2021-08-23 | End: 2021-08-23

## 2021-08-23 RX ADMIN — METHYLPREDNISOLONE ACETATE 80 MG: 80 INJECTION, SUSPENSION INTRA-ARTICULAR; INTRALESIONAL; INTRAMUSCULAR; SOFT TISSUE at 15:16

## 2021-08-23 SDOH — ECONOMIC STABILITY: FOOD INSECURITY: WITHIN THE PAST 12 MONTHS, YOU WORRIED THAT YOUR FOOD WOULD RUN OUT BEFORE YOU GOT MONEY TO BUY MORE.: NEVER TRUE

## 2021-08-23 SDOH — ECONOMIC STABILITY: FOOD INSECURITY: WITHIN THE PAST 12 MONTHS, THE FOOD YOU BOUGHT JUST DIDN'T LAST AND YOU DIDN'T HAVE MONEY TO GET MORE.: NEVER TRUE

## 2021-08-23 ASSESSMENT — ENCOUNTER SYMPTOMS
SHORTNESS OF BREATH: 0
NAUSEA: 0
VOMITING: 0
CONSTIPATION: 0
CHEST TIGHTNESS: 0
COUGH: 0
WHEEZING: 0
SORE THROAT: 0
DIARRHEA: 0
ABDOMINAL PAIN: 0
COLOR CHANGE: 0
SINUS PRESSURE: 0
ABDOMINAL DISTENTION: 0
STRIDOR: 0
BACK PAIN: 0

## 2021-08-23 ASSESSMENT — SOCIAL DETERMINANTS OF HEALTH (SDOH): HOW HARD IS IT FOR YOU TO PAY FOR THE VERY BASICS LIKE FOOD, HOUSING, MEDICAL CARE, AND HEATING?: NOT VERY HARD

## 2021-08-23 NOTE — PROGRESS NOTES
Zaid Andre (:  1950) is a 80 y.o. male,Established patient, here for evaluation of the following chief complaint(s):  Shoulder Pain (right shoulder)         ASSESSMENT/PLAN:  1. Chronic right shoulder pain  -     methylPREDNISolone acetate (DEPO-MEDROL) injection 80 mg; 80 mg, Intra-articular, ONCE, On 21 at 1545, For 1 dose  -     [place] ARTHROCENTESIS ASPIR&/INJ MAJOR JT/BURSA W/O US  2. Complete tear of right rotator cuff, unspecified whether traumatic  -     methylPREDNISolone acetate (DEPO-MEDROL) injection 80 mg; 80 mg, Intra-articular, ONCE, On 21 at 1545, For 1 dose  -     NM ARTHROCENTESIS ASPIR&/INJ MAJOR JT/BURSA W/O US      After consent was obtained, using sterile technique the right shoulder was prepped and ethyl chloride was used to anesthetize the needle tract into the joint from the posterior approach. The shoulder joint was entered. Steroid Depo-Medrol 80 mg and 1 ml plain Lidocaine was then injected and the needle withdrawn. The procedure was well tolerated. The patient is asked to continue to rest the shoulder for a few more days before resuming regular activities. It may be more painful for the first 1-2 days. Watch for fever, or increased swelling or persistent pain in shoulder. Call or return to clinic prn if such symptoms occur or the shoulder fails to improve as anticipated. Return if symptoms worsen or fail to improve. Subjective   SUBJECTIVE/OBJECTIVE:  HPI    Right shoulder pain. Ongoing for years. Had MRI and showed rotator cuff tear. Seen ortho who recommend surgery or shoulder injections. Pt would like to wait to have surgery. Had injections in the past and been doing good. Review of Systems   Constitutional: Negative for activity change, appetite change, chills, diaphoresis, fatigue, fever and unexpected weight change. HENT: Negative for congestion, ear pain, postnasal drip, sinus pressure and sore throat.     Eyes: Negative for visual disturbance. Respiratory: Negative for cough, chest tightness, shortness of breath, wheezing and stridor. Cardiovascular: Negative for chest pain, palpitations and leg swelling. Gastrointestinal: Negative for abdominal distention, abdominal pain, constipation, diarrhea, nausea and vomiting. Endocrine: Negative for polydipsia, polyphagia and polyuria. Genitourinary: Negative for decreased urine volume, difficulty urinating, dysuria, flank pain, frequency, hematuria and urgency. Musculoskeletal: Positive for arthralgias. Negative for back pain, gait problem, joint swelling, myalgias and neck pain. Skin: Negative for color change, pallor and rash. Neurological: Negative for dizziness, syncope, weakness, light-headedness, numbness and headaches. Hematological: Negative for adenopathy. Psychiatric/Behavioral: Negative for behavioral problems, self-injury and sleep disturbance. The patient is not nervous/anxious. Objective   Physical Exam  Constitutional:       Appearance: Normal appearance. HENT:      Head: Normocephalic. Right Ear: Tympanic membrane normal.      Left Ear: Tympanic membrane normal.      Mouth/Throat:      Mouth: Mucous membranes are moist.      Pharynx: Oropharynx is clear. No oropharyngeal exudate or posterior oropharyngeal erythema. Cardiovascular:      Rate and Rhythm: Normal rate. Pulmonary:      Effort: Pulmonary effort is normal.   Musculoskeletal:         General: Tenderness present. No swelling, deformity or signs of injury. Skin:     General: Skin is warm and dry. Neurological:      Mental Status: He is alert and oriented to person, place, and time. On this date 8/23/2021 I have spent 20 minutes reviewing previous notes, test results and face to face with the patient discussing the diagnosis and importance of compliance with the treatment plan as well as documenting on the day of the visit.       An electronic signature was used to authenticate this note.     --Radha Najera, CLARISSA - CNP

## 2021-09-02 ENCOUNTER — OFFICE VISIT (OUTPATIENT)
Dept: SURGERY | Age: 71
End: 2021-09-02
Payer: MEDICARE

## 2021-09-02 VITALS
RESPIRATION RATE: 18 BRPM | BODY MASS INDEX: 25.2 KG/M2 | WEIGHT: 180 LBS | HEIGHT: 71 IN | HEART RATE: 65 BPM | OXYGEN SATURATION: 97 % | SYSTOLIC BLOOD PRESSURE: 134 MMHG | TEMPERATURE: 97.2 F | DIASTOLIC BLOOD PRESSURE: 70 MMHG

## 2021-09-02 DIAGNOSIS — K63.5 CECAL POLYP: Primary | ICD-10-CM

## 2021-09-02 DIAGNOSIS — Z01.818 PRE-OP TESTING: ICD-10-CM

## 2021-09-02 PROCEDURE — 3017F COLORECTAL CA SCREEN DOC REV: CPT | Performed by: SURGERY

## 2021-09-02 PROCEDURE — G8427 DOCREV CUR MEDS BY ELIG CLIN: HCPCS | Performed by: SURGERY

## 2021-09-02 PROCEDURE — 1036F TOBACCO NON-USER: CPT | Performed by: SURGERY

## 2021-09-02 PROCEDURE — 99204 OFFICE O/P NEW MOD 45 MIN: CPT | Performed by: SURGERY

## 2021-09-02 PROCEDURE — 4040F PNEUMOC VAC/ADMIN/RCVD: CPT | Performed by: SURGERY

## 2021-09-02 PROCEDURE — 1123F ACP DISCUSS/DSCN MKR DOCD: CPT | Performed by: SURGERY

## 2021-09-02 PROCEDURE — G8417 CALC BMI ABV UP PARAM F/U: HCPCS | Performed by: SURGERY

## 2021-09-02 ASSESSMENT — ENCOUNTER SYMPTOMS
SINUS PRESSURE: 0
COLOR CHANGE: 0
ABDOMINAL DISTENTION: 0
WHEEZING: 0
BACK PAIN: 0
SORE THROAT: 0
NAUSEA: 0
APNEA: 0
CHEST TIGHTNESS: 0
ANAL BLEEDING: 0
PHOTOPHOBIA: 0
TROUBLE SWALLOWING: 0
BLOOD IN STOOL: 0
SHORTNESS OF BREATH: 0
DIARRHEA: 0
COUGH: 0
FACIAL SWELLING: 0
CHOKING: 0
VOICE CHANGE: 0
STRIDOR: 0
EYE DISCHARGE: 0
EYE PAIN: 0
ALLERGIC/IMMUNOLOGIC NEGATIVE: 1
EYE REDNESS: 0
CONSTIPATION: 0
RHINORRHEA: 0
RECTAL PAIN: 0
VOMITING: 0
ABDOMINAL PAIN: 0
EYE ITCHING: 0

## 2021-09-02 NOTE — PROGRESS NOTES
Piercenerijuan diego Youngblood (:  1950)     ASSESSMENT:  1. Unresectable cecal adenoma polyp  2. History of multiple polyps cecum/ascending colon    PLAN:  1. Schedule Aj for robotic right colectomy. 2. He will undergo pre-operative clearance per anesthesia guidelines with risk factors listed under the past medical history diagnosis & problem list.  3. The risks, benefits and alternatives were discussed with Luis Alberto including non-operative management. The pros and cons of robotic, laparoscopic and open techniques were discussed. All questions answered. He understands and wishes to proceed with surgical intervention. 4. Restrictions discussed with Luis Alberto and he expresses understanding. 5. He is advised to call back directly if there are further questions/concerns, or if his symptoms worsen prior to surgery. SUBJECTIVE/OBJECTIVE:    Chief Complaint   Patient presents with    Surgical Consult     New patient ref Dr. Lara Chavez colonoscopy -- cecum polyp      HPI  Luis Alberto is a 52-year-old male who presents for evaluation of unresectable cecal polyp. Pathology demonstrated tubular adenoma. Flat carpet-like lesion. History of multiple previous polyps removed especially in the cecum and ascending colon. Patient denies any unexplained weight loss. No fever, chills or sweats. Denies abdominal pain or tenderness. No abdominal bloating/distention. No chest pain or shortness of breath. No change in bowel function. No hematochezia or melena. No new urinary complaints. Tolerating diet. Denies history of major abdominal surgery. Review of Systems   Constitutional: Negative for activity change, appetite change, chills, diaphoresis, fatigue, fever and unexpected weight change.    HENT: Negative for congestion, dental problem, drooling, ear discharge, ear pain, facial swelling, hearing loss, mouth sores, nosebleeds, postnasal drip, rhinorrhea, sinus pressure, sneezing, sore throat, tinnitus, trouble swallowing and voice change. Eyes: Negative for photophobia, pain, discharge, redness, itching and visual disturbance. Respiratory: Negative for apnea, cough, choking, chest tightness, shortness of breath, wheezing and stridor. Cardiovascular: Negative for chest pain, palpitations and leg swelling. Gastrointestinal: Negative for abdominal distention, abdominal pain, anal bleeding, blood in stool, constipation, diarrhea, nausea, rectal pain and vomiting. Endocrine: Negative. Genitourinary: Negative for decreased urine volume, difficulty urinating, discharge, dysuria, enuresis, flank pain, frequency, genital sores, hematuria, penile pain, penile swelling, scrotal swelling, testicular pain and urgency. Musculoskeletal: Negative for arthralgias, back pain, gait problem, joint swelling, myalgias, neck pain and neck stiffness. Skin: Negative for color change, pallor, rash and wound. Allergic/Immunologic: Negative. Neurological: Negative for dizziness, tremors, seizures, syncope, facial asymmetry, speech difficulty, weakness, light-headedness, numbness and headaches. Hematological: Negative for adenopathy. Does not bruise/bleed easily. Psychiatric/Behavioral: Negative for agitation, behavioral problems, confusion, decreased concentration, dysphoric mood, hallucinations, self-injury, sleep disturbance and suicidal ideas. The patient is not nervous/anxious and is not hyperactive. History reviewed. No pertinent past medical history. Past Surgical History:   Procedure Laterality Date    COLONOSCOPY  08/26/2021    Dr. Marcie Albert Right      2000 and 2009    SHOULDER ARTHROPLASTY Left     [facility], [address] TOTAL HIP ARTHROPLASTY  2016    OIO       Current Outpatient Medications   Medication Sig Dispense Refill    meloxicam (MOBIC) 15 MG tablet Take 1 tablet by mouth daily 90 tablet 3     No current facility-administered medications for this visit.        No Known Allergies    Family History   Problem Relation Age of Onset    Breast Cancer Mother        Social History     Socioeconomic History    Marital status:      Spouse name: Not on file    Number of children: 2    Years of education: Not on file    Highest education level: Some college, no degree   Occupational History    Not on file   Tobacco Use    Smoking status: Former Smoker     Packs/day: 0.25     Years: 10.00     Pack years: 2.50     Types: Cigarettes     Quit date: 11/15/1992     Years since quittin.8    Smokeless tobacco: Never Used   Substance and Sexual Activity    Alcohol use: Yes     Alcohol/week: 20.0 standard drinks     Types: 20 Standard drinks or equivalent per week     Comment: social    Drug use: No    Sexual activity: Not Currently   Other Topics Concern    Not on file   Social History Narrative    Not on file     Social Determinants of Health     Financial Resource Strain: Low Risk     Difficulty of Paying Living Expenses: Not very hard   Food Insecurity: No Food Insecurity    Worried About Running Out of Food in the Last Year: Never true    Barbra of Food in the Last Year: Never true   Transportation Needs: No Transportation Needs    Lack of Transportation (Medical): No    Lack of Transportation (Non-Medical):  No   Physical Activity:     Days of Exercise per Week:     Minutes of Exercise per Session:    Stress:     Feeling of Stress :    Social Connections:     Frequency of Communication with Friends and Family:     Frequency of Social Gatherings with Friends and Family:     Attends [employer] Services:     Active Member of Clubs or Organizations:     Attends Club or Organization Meetings:     Marital Status:    Intimate Partner Violence:     Fear of Current or Ex-Partner:     Emotionally Abused:     Physically Abused:     Sexually Abused:      Vitals:    21 0834   BP: 134/70   Site: Left Upper Arm   Position: Sitting   Cuff Size: Medium Adult   Pulse: 65   Resp: 18 Temp: 97.2 °F (36.2 °C)   TempSrc: Infrared   SpO2: 97%   Weight: 180 lb (81.6 kg)   Height: 5' 11\" (1.803 m)     Body mass index is 25.1 kg/m². Wt Readings from Last 3 Encounters:   09/02/21 180 lb (81.6 kg)   08/23/21 181 lb (82.1 kg)   04/13/21 183 lb 9.6 oz (83.3 kg)     Physical Exam  Vitals reviewed. Constitutional:       General: He is not in acute distress. Appearance: He is well-developed. He is not diaphoretic. HENT:      Head: Normocephalic and atraumatic. Right Ear: External ear normal.      Left Ear: External ear normal.      Nose: Nose normal.   Eyes:      General: No scleral icterus. Right eye: No discharge. Left eye: No discharge. Conjunctiva/sclera: Conjunctivae normal.   Cardiovascular:      Rate and Rhythm: Normal rate and regular rhythm. Heart sounds: Normal heart sounds. Pulmonary:      Effort: Pulmonary effort is normal. No respiratory distress. Breath sounds: Normal breath sounds. No wheezing or rales. Chest:      Chest wall: No tenderness. Abdominal:      General: Bowel sounds are normal. There is no distension. Palpations: Abdomen is soft. There is no mass. Tenderness: There is no abdominal tenderness. There is no guarding or rebound. Musculoskeletal:         General: No tenderness. Normal range of motion. Cervical back: Normal range of motion and neck supple. Skin:     General: Skin is warm and dry. Coloration: Skin is not pale. Findings: No erythema or rash. Neurological:      Mental Status: He is alert and oriented to person, place, and time. Cranial Nerves: No cranial nerve deficit. Psychiatric:         Behavior: Behavior normal.         Thought Content:  Thought content normal.         Judgment: Judgment normal.       Lab Results   Component Value Date    WBC 4.1 (L) 12/18/2019    HGB 14.5 12/18/2019    HCT 43.1 12/18/2019    MCV 93 12/18/2019     12/18/2019     Lab Results   Component Value Date     12/18/2019    K 4.4 12/18/2019     12/18/2019    CO2 29 12/18/2019     Lab Results   Component Value Date    CREATININE 0.97 12/18/2019     Lab Results   Component Value Date    ALT 19 12/18/2019    AST 17 12/18/2019    ALKPHOS 68 12/18/2019    BILITOT 0.5 12/18/2019     No results found for: LIPASE    Patient Active Problem List   Diagnosis    Lower back pain    Elbow pain, right    Hip pain, left    Hip pain, right    Shoulder pain, left    Encounter for long-term (current) use of NSAIDs                                An electronic signature was used to authenticate this note.     --Andrés Leger MD

## 2021-09-02 NOTE — LETTER
[address] Surgery  [place] [address] [address] [facility] [facility] [address]  Phone: [phone]  Fax: [phone]           Emili Redd MD      September 2, 2021     Patient: Kenn Goetz   MR Number: [id number]   YOB: 1950   Date of Visit: 9/2/2021       Dear Dr. Genesis Knox: Thank you for referring Adrian Worthington to me for evaluation/treatment. Below are the relevant portions of my assessment and plan of care. ASSESSMENT:  1. Unresectable cecal adenoma polyp  2. History of multiple polyps cecum/ascending colon    PLAN:  1. Schedule Aj for robotic right colectomy. 2. He will undergo pre-operative clearance per anesthesia guidelines with risk factors listed under the past medical history diagnosis & problem list.  3. The risks, benefits and alternatives were discussed with Desire Landrum including non-operative management. The pros and cons of robotic, laparoscopic and open techniques were discussed. All questions answered. He understands and wishes to proceed with surgical intervention. 4. Restrictions discussed with Desire Landrum and he expresses understanding. 5. He is advised to call back directly if there are further questions/concerns, or if his symptoms worsen prior to surgery. If you have questions, please do not hesitate to call me. I look forward to following Desire Landrum along with you.     Sincerely,        Emili Redd MD     providers:  Juliet Silva MD  [address] [phone]  [address] [place]

## 2021-09-13 NOTE — PROGRESS NOTES
[address] [place] [address]  [address] [place] [address]  Dept: [phone]  Dept Fax: [phone]  Loc: [address] Carmela Kenney is a 72 y.o. male who presents today for his medical conditions/complaints as noted below. Chief Complaint   Patient presents with    Shoulder Pain     right shoulder pain           HPI:     HPI    Right shoulder pain. Torn tendons on MRI. Has been getting steroid injections which has helped. Last one was in march. No other changes. No past medical history on file. Past Surgical History:   Procedure Laterality Date    ROTATOR CUFF REPAIR Right       and        No family history on file. Social History     Tobacco Use    Smoking status: Former Smoker     Packs/day: 0.25     Years: 10.00     Pack years: 2.50     Types: Cigarettes     Last attempt to quit: 11/15/1992     Years since quittin.7    Smokeless tobacco: Never Used   Substance Use Topics    Alcohol use: Yes     Alcohol/week: 20.0 standard drinks     Types: 20 Standard drinks or equivalent per week      Current Outpatient Medications   Medication Sig Dispense Refill    meloxicam (MOBIC) 15 MG tablet Take 1 tablet by mouth daily 90 tablet 3     No current facility-administered medications for this visit.       No Known Allergies    Health Maintenance   Topic Date Due    Hepatitis C screen  1950    DTaP/Tdap/Td vaccine (1 - Tdap) 1970    Shingles Vaccine (1 of 2) 2001    A1C test (Diabetic or Prediabetic)  2015    Flu vaccine (1) 2020    Annual Wellness Visit (AWV)  2020    Lipid screen  2024    Colon cancer screen colonoscopy  2026    Pneumococcal 65+ years Vaccine  Completed    AAA screen  Completed    Hepatitis A vaccine  Aged Out    Hepatitis B vaccine  Aged Out    Hib vaccine  Aged Out    Meningococcal (ACWY) vaccine  Aged Out       Subjective:      Review of Systems   Constitutional: Negative for activity change, appetite change, chills, diaphoresis, fatigue, fever and unexpected weight change. HENT: Negative for congestion, ear pain, postnasal drip, sinus pressure and sore throat. Eyes: Negative for visual disturbance. Respiratory: Negative for cough, chest tightness, shortness of breath, wheezing and stridor. Cardiovascular: Negative for chest pain, palpitations and leg swelling. Gastrointestinal: Negative for abdominal distention, abdominal pain, constipation, diarrhea, nausea and vomiting. Endocrine: Negative for polydipsia, polyphagia and polyuria. Genitourinary: Negative for decreased urine volume, difficulty urinating, dysuria, flank pain, frequency, hematuria and urgency. Musculoskeletal: Positive for arthralgias. Negative for back pain, gait problem, joint swelling, myalgias and neck pain. Skin: Negative for color change, pallor and rash. Neurological: Negative for dizziness, syncope, weakness, light-headedness, numbness and headaches. Hematological: Negative for adenopathy. Psychiatric/Behavioral: Negative for behavioral problems, self-injury and sleep disturbance. The patient is not nervous/anxious. Objective:     Physical Exam  Vitals signs and nursing note reviewed. Constitutional:       Appearance: Normal appearance. He is well-developed. HENT:      Head: Normocephalic. Right Ear: Tympanic membrane and external ear normal.      Left Ear: Tympanic membrane and external ear normal.      Nose: Nose normal.      Right Sinus: No maxillary sinus tenderness. Left Sinus: No maxillary sinus tenderness. Mouth/Throat:      Pharynx: Uvula midline. Eyes:      Pupils: Pupils are equal, round, and reactive to light. Neck:      Musculoskeletal: Normal range of motion and neck supple. Thyroid: No thyromegaly. Vascular: No carotid bruit or JVD.       Trachea: Trachea normal.   Cardiovascular:      Rate and Rhythm: Normal rate and regular rhythm. Heart sounds: Normal heart sounds. No murmur. Pulmonary:      Effort: Pulmonary effort is normal. No respiratory distress. Breath sounds: Normal breath sounds. No decreased breath sounds, wheezing, rhonchi or rales. Chest:      Chest wall: No tenderness. Abdominal:      General: Bowel sounds are normal. There is no distension. Palpations: Abdomen is soft. There is no mass. Tenderness: There is no abdominal tenderness. Musculoskeletal:         General: Tenderness present. No swelling, deformity or signs of injury. Right shoulder: He exhibits decreased range of motion, tenderness and pain. Lymphadenopathy:      Cervical: No cervical adenopathy. Skin:     General: Skin is warm and dry. Findings: No erythema or rash. Neurological:      Mental Status: He is alert and oriented to person, place, and time. Cranial Nerves: No cranial nerve deficit. Sensory: No sensory deficit. Motor: No abnormal muscle tone. Coordination: Coordination normal.      Gait: Gait normal.   Psychiatric:         Behavior: Behavior normal.         Thought Content: Thought content normal.         Judgment: Judgment normal.       /76 (Site: Right Upper Arm, Position: Sitting, Cuff Size: Medium Adult)   Pulse 76   Temp 97.5 °F (36.4 °C) (Temporal)   Wt 177 lb (80.3 kg)   SpO2 98%   BMI 24.69 kg/m²     Assessment:       Diagnosis Orders   1. Chronic right shoulder pain  triamcinolone acetonide (KENALOG-40) injection 40 mg    [place] ARTHROCENTESIS ASPIR&/INJ MAJOR JT/BURSA W/O US       Plan:     After consent was obtained, using sterile technique the right shoulder was prepped and ethyl chloride was used to anesthetize the area of the posterior approach. The right shoulder joint was entered. Steroid kenalog 40 mg and 1 ml plain marcaine was then injected and the needle withdrawn. The procedure was well tolerated.   The patient is asked to continue to rest the shoulder for a few more days before resuming regular activities. It may be more painful for the first 1-2 days. Watch for fever, or increased swelling or persistent pain in shoulder. Call or return to clinic prn if such symptoms occur or the shoulder fails to improve as anticipated. Discussed use, benefit, and side effects of prescribed medications. Barriers tomedication compliance addressed. All patient questions answered. Pt voiced understanding. Return if symptoms worsen or fail to improve. Orders Placed This Encounter   Procedures    [place] ARTHROCENTESIS ASPIR&/INJ MAJOR JT/BURSA W/O US     Orders Placed This Encounter   Medications    triamcinolone acetonide (KENALOG-40) injection 40 mg        Reccommended tobacco cessation options including pharmacologicmethods, counseled great than 3 minutes during this visit:  Yes  []  No  []     Patient given educational materials - see patient instructions. Discussed use, benefit, and sideeffects of prescribed medications. All patient questions answered. Pt voiced understanding. Reviewed health maintenance. Instructed to continue current medications, diet and exercise. Patient agreed with treatment plan. Follow up as directed.      Electronically signed by CLARISSA Kimbrough CNP on 8/17/2020 at 11:13 AM 87 year old man presents to [facility] for preprocedure exam. Patient is for planned cystoscopy, right ureteroscopy, possible biopsy, possible ureteral dilatation, right ureteral stent placement for kidney stones with Dr Chapman on 9/24.  87 year old man presents to [facility] for preprocedure exam. Patient is for planned cystoscopy, right ureteroscopy, possible biopsy, possible ureteral dilatation, right ureteral stent placement for kidney stones with Dr Chapman on 9/24. Patient without  symptoms, he stated that they found ureteral narrowing when he was having a workup for increased creatinine level. No acute complaints.

## 2021-09-24 LAB
ABSOLUTE BASO #: 0 X10E9/L (ref 0–0.2)
ABSOLUTE EOS #: 0.1 X10E9/L (ref 0–0.4)
ABSOLUTE LYMPH #: 1.8 X10E9/L (ref 1–3.5)
ABSOLUTE MONO #: 0.3 X10E9/L (ref 0–0.9)
ABSOLUTE NEUT #: 3.3 X10E9/L (ref 1.5–6.6)
BASOPHILS RELATIVE PERCENT: 0.8 %
EOSINOPHILS RELATIVE PERCENT: 2.6 %
HCT VFR BLD CALC: 41.4 % (ref 39–49)
HEMOGLOBIN: 14.1 G/DL (ref 13–17)
LYMPHOCYTE %: 32.7 %
MCH RBC QN AUTO: 31.5 PG (ref 27–34)
MCHC RBC AUTO-ENTMCNC: 34.1 G/DL (ref 32–36)
MCV RBC AUTO: 93 FL (ref 80–100)
MONOCYTES # BLD: 5.6 %
NEUTROPHILS RELATIVE PERCENT: 58.3 %
PDW BLD-RTO: 12.8 % (ref 11.5–15)
PLATELETS: 239 X10E9/L (ref 150–450)
PMV BLD AUTO: 7.9 FL (ref 7–12)
RBC: 4.47 X10E12/L (ref 4.1–5.7)
WBC: 5.7 X10E9/L (ref 4–11)

## 2021-11-04 ENCOUNTER — PREP FOR PROCEDURE (OUTPATIENT)
Dept: SURGERY | Age: 71
End: 2021-11-04

## 2021-11-04 DIAGNOSIS — K63.5 CECAL POLYP: Primary | ICD-10-CM

## 2021-11-04 RX ORDER — ALVIMOPAN 12 MG/1
12 CAPSULE ORAL 2 TIMES DAILY
Status: CANCELLED | OUTPATIENT
Start: 2021-11-04 | End: 2021-11-11

## 2021-11-04 RX ORDER — METRONIDAZOLE 500 MG/1
TABLET ORAL
Qty: 3 TABLET | Refills: 0 | Status: ON HOLD | OUTPATIENT
Start: 2021-11-04 | End: 2021-12-02 | Stop reason: HOSPADM

## 2021-11-04 RX ORDER — SODIUM CHLORIDE 9 MG/ML
INJECTION, SOLUTION INTRAVENOUS CONTINUOUS
Status: CANCELLED | OUTPATIENT
Start: 2021-11-04

## 2021-11-04 RX ORDER — ONDANSETRON 2 MG/ML
4 INJECTION INTRAMUSCULAR; INTRAVENOUS EVERY 4 HOURS PRN
Status: CANCELLED | OUTPATIENT
Start: 2021-11-04

## 2021-11-04 RX ORDER — ALVIMOPAN 12 MG/1
12 CAPSULE ORAL ONCE
Status: CANCELLED | OUTPATIENT
Start: 2021-11-04 | End: 2021-11-04

## 2021-11-25 NOTE — H&P
Raymond Zafar (:  1950)      ASSESSMENT:  1. Unresectable cecal adenoma polyp  2. History of multiple polyps cecum/ascending colon     PLAN:  1. Schedule Aj for robotic right colectomy. 2. He will undergo pre-operative clearance per anesthesia guidelines with risk factors listed under the past medical history diagnosis & problem list.  3. The risks, benefits and alternatives were discussed with Tony Oneill including non-operative management. The pros and cons of robotic, laparoscopic and open techniques were discussed. All questions answered. He understands and wishes to proceed with surgical intervention. 4. Restrictions discussed with Tony Oneill and he expresses understanding. 5. He is advised to call back directly if there are further questions/concerns, or if his symptoms worsen prior to surgery.     SUBJECTIVE/OBJECTIVE:          Chief Complaint   Patient presents with    Surgical Consult       New patient ref Dr. Lindsey Lam colonoscopy -- cecum polyp       HPI  Tony Oneill is a 71-year-old male who presents for evaluation of unresectable cecal polyp. Pathology demonstrated tubular adenoma. Flat carpet-like lesion. History of multiple previous polyps removed especially in the cecum and ascending colon. Patient denies any unexplained weight loss. No fever, chills or sweats. Denies abdominal pain or tenderness. No abdominal bloating/distention. No chest pain or shortness of breath. No change in bowel function. No hematochezia or melena. No new urinary complaints. Tolerating diet. Denies history of major abdominal surgery.     Review of Systems   Constitutional: Negative for activity change, appetite change, chills, diaphoresis, fatigue, fever and unexpected weight change.    HENT: Negative for congestion, dental problem, drooling, ear discharge, ear pain, facial swelling, hearing loss, mouth sores, nosebleeds, postnasal drip, rhinorrhea, sinus pressure, sneezing, sore throat, tinnitus, trouble tablet by mouth daily 90 tablet 3      No current facility-administered medications for this visit.            No Known Allergies     Family History         Family History   Problem Relation Age of Onset    Breast Cancer Mother              Social History               Socioeconomic History    Marital status:        Spouse name: Not on file    Number of children: 2    Years of education: Not on file    Highest education level: Some college, no degree   Occupational History    Not on file   Tobacco Use    Smoking status: Former Smoker       Packs/day: 0.25       Years: 10.00       Pack years: 2.50       Types: Cigarettes       Quit date: 11/15/1992       Years since quittin.8    Smokeless tobacco: Never Used   Substance and Sexual Activity    Alcohol use: Yes       Alcohol/week: 20.0 standard drinks       Types: 20 Standard drinks or equivalent per week       Comment: social    Drug use: No    Sexual activity: Not Currently   Other Topics Concern    Not on file   Social History Narrative    Not on file      Social Determinants of Health          Financial Resource Strain: Low Risk     Difficulty of Paying Living Expenses: Not very hard   Food Insecurity: No Food Insecurity    Worried About Running Out of Food in the Last Year: Never true    Barbra of Food in the Last Year: Never true   Transportation Needs: No Transportation Needs    Lack of Transportation (Medical): No    Lack of Transportation (Non-Medical):  No   Physical Activity:     Days of Exercise per Week:     Minutes of Exercise per Session:    Stress:     Feeling of Stress :    Social Connections:     Frequency of Communication with Friends and Family:     Frequency of Social Gatherings with Friends and Family:     Attends Yazdanism Services:     Active Member of Clubs or Organizations:     Attends Club or Organization Meetings:     Marital Status:    Intimate Partner Violence:     Fear of Current or Ex-Partner:  Emotionally Abused:     Physically Abused:     Sexually Abused:          Vitals       Vitals:     09/02/21 0834   BP: 134/70   Site: Left Upper Arm   Position: Sitting   Cuff Size: Medium Adult   Pulse: 65   Resp: 18   Temp: 97.2 °F (36.2 °C)   TempSrc: Infrared   SpO2: 97%   Weight: 180 lb (81.6 kg)   Height: 5' 11\" (1.803 m)         Body mass index is 25.1 kg/m².         Wt Readings from Last 3 Encounters:   09/02/21 180 lb (81.6 kg)   08/23/21 181 lb (82.1 kg)   04/13/21 183 lb 9.6 oz (83.3 kg)      Physical Exam  Vitals reviewed. Constitutional:       General: He is not in acute distress. Appearance: He is well-developed. He is not diaphoretic. HENT:      Head: Normocephalic and atraumatic. Right Ear: External ear normal.      Left Ear: External ear normal.      Nose: Nose normal.   Eyes:      General: No scleral icterus. Right eye: No discharge. Left eye: No discharge. Conjunctiva/sclera: Conjunctivae normal.   Cardiovascular:      Rate and Rhythm: Normal rate and regular rhythm. Heart sounds: Normal heart sounds. Pulmonary:      Effort: Pulmonary effort is normal. No respiratory distress. Breath sounds: Normal breath sounds. No wheezing or rales. Chest:      Chest wall: No tenderness. Abdominal:      General: Bowel sounds are normal. There is no distension. Palpations: Abdomen is soft. There is no mass. Tenderness: There is no abdominal tenderness. There is no guarding or rebound. Musculoskeletal:         General: No tenderness. Normal range of motion. Cervical back: Normal range of motion and neck supple. Skin:     General: Skin is warm and dry. Coloration: Skin is not pale. Findings: No erythema or rash. Neurological:      Mental Status: He is alert and oriented to person, place, and time. Cranial Nerves: No cranial nerve deficit. Psychiatric:         Behavior: Behavior normal.         Thought Content:  Thought content normal.         Judgment: Judgment normal.               Lab Results   Component Value Date     WBC 4.1 (L) 12/18/2019     HGB 14.5 12/18/2019     HCT 43.1 12/18/2019     MCV 93 12/18/2019      12/18/2019            Lab Results   Component Value Date      12/18/2019     K 4.4 12/18/2019      12/18/2019     CO2 29 12/18/2019            Lab Results   Component Value Date     CREATININE 0.97 12/18/2019            Lab Results   Component Value Date     ALT 19 12/18/2019     AST 17 12/18/2019     ALKPHOS 68 12/18/2019     BILITOT 0.5 12/18/2019      No results found for: LIPASE         Patient Active Problem List   Diagnosis    Lower back pain    Elbow pain, right    Hip pain, left    Hip pain, right    Shoulder pain, left    Encounter for long-term (current) use of NSAIDs                                          An electronic signature was used to authenticate this note.  Radha Christie MD

## 2021-11-30 ENCOUNTER — HOSPITAL ENCOUNTER (INPATIENT)
Age: 71
LOS: 2 days | Discharge: HOME | DRG: 331 | End: 2021-12-02
Attending: SURGERY | Admitting: SURGERY
Payer: MEDICARE

## 2021-11-30 ENCOUNTER — ANESTHESIA EVENT (OUTPATIENT)
Dept: OPERATING ROOM | Age: 71
DRG: 331 | End: 2021-11-30
Payer: MEDICARE

## 2021-11-30 ENCOUNTER — ANESTHESIA (OUTPATIENT)
Dept: OPERATING ROOM | Age: 71
DRG: 331 | End: 2021-11-30
Payer: MEDICARE

## 2021-11-30 VITALS — SYSTOLIC BLOOD PRESSURE: 158 MMHG | TEMPERATURE: 95.5 F | DIASTOLIC BLOOD PRESSURE: 77 MMHG | OXYGEN SATURATION: 100 %

## 2021-11-30 DIAGNOSIS — Z90.49 S/P LAPAROSCOPIC COLECTOMY: Primary | ICD-10-CM

## 2021-11-30 PROBLEM — K63.5 COLON POLYP: Status: ACTIVE | Noted: 2021-11-30

## 2021-11-30 LAB
ABO: NORMAL
ANTIBODY SCREEN: NORMAL
RH FACTOR: NORMAL

## 2021-11-30 PROCEDURE — 88341 IMHCHEM/IMCYTCHM EA ADD ANTB: CPT

## 2021-11-30 PROCEDURE — 6360000002 HC RX W HCPCS

## 2021-11-30 PROCEDURE — 44204 LAPARO PARTIAL COLECTOMY: CPT | Performed by: SURGERY

## 2021-11-30 PROCEDURE — 2500000003 HC RX 250 WO HCPCS

## 2021-11-30 PROCEDURE — 2580000003 HC RX 258

## 2021-11-30 PROCEDURE — 6370000000 HC RX 637 (ALT 250 FOR IP): Performed by: SURGERY

## 2021-11-30 PROCEDURE — 6360000002 HC RX W HCPCS: Performed by: ANESTHESIOLOGY

## 2021-11-30 PROCEDURE — 3600000009 HC SURGERY ROBOT BASE: Performed by: SURGERY

## 2021-11-30 PROCEDURE — 88342 IMHCHEM/IMCYTCHM 1ST ANTB: CPT

## 2021-11-30 PROCEDURE — 3600000019 HC SURGERY ROBOT ADDTL 15MIN: Performed by: SURGERY

## 2021-11-30 PROCEDURE — 7100000001 HC PACU RECOVERY - ADDTL 15 MIN: Performed by: SURGERY

## 2021-11-30 PROCEDURE — 3700000001 HC ADD 15 MINUTES (ANESTHESIA): Performed by: SURGERY

## 2021-11-30 PROCEDURE — 7100000011 HC PHASE II RECOVERY - ADDTL 15 MIN: Performed by: SURGERY

## 2021-11-30 PROCEDURE — 7100000000 HC PACU RECOVERY - FIRST 15 MIN: Performed by: SURGERY

## 2021-11-30 PROCEDURE — 8E0W4CZ ROBOTIC ASSISTED PROCEDURE OF TRUNK REGION, PERCUTANEOUS ENDOSCOPIC APPROACH: ICD-10-PCS | Performed by: SURGERY

## 2021-11-30 PROCEDURE — 2709999900 HC NON-CHARGEABLE SUPPLY: Performed by: SURGERY

## 2021-11-30 PROCEDURE — 36415 COLL VENOUS BLD VENIPUNCTURE: CPT

## 2021-11-30 PROCEDURE — 88309 TISSUE EXAM BY PATHOLOGIST: CPT

## 2021-11-30 PROCEDURE — 86900 BLOOD TYPING SEROLOGIC ABO: CPT

## 2021-11-30 PROCEDURE — 3700000000 HC ANESTHESIA ATTENDED CARE: Performed by: SURGERY

## 2021-11-30 PROCEDURE — 86901 BLOOD TYPING SEROLOGIC RH(D): CPT

## 2021-11-30 PROCEDURE — 0DTF4ZZ RESECTION OF RIGHT LARGE INTESTINE, PERCUTANEOUS ENDOSCOPIC APPROACH: ICD-10-PCS | Performed by: SURGERY

## 2021-11-30 PROCEDURE — S2900 ROBOTIC SURGICAL SYSTEM: HCPCS | Performed by: SURGERY

## 2021-11-30 PROCEDURE — 1200000000 HC SEMI PRIVATE

## 2021-11-30 PROCEDURE — 7100000010 HC PHASE II RECOVERY - FIRST 15 MIN: Performed by: SURGERY

## 2021-11-30 PROCEDURE — 86850 RBC ANTIBODY SCREEN: CPT

## 2021-11-30 PROCEDURE — 2720000010 HC SURG SUPPLY STERILE: Performed by: SURGERY

## 2021-11-30 PROCEDURE — 2500000003 HC RX 250 WO HCPCS: Performed by: SURGERY

## 2021-11-30 PROCEDURE — 2580000003 HC RX 258: Performed by: SURGERY

## 2021-11-30 RX ORDER — BUPIVACAINE HYDROCHLORIDE 5 MG/ML
INJECTION, SOLUTION PERINEURAL PRN
Status: DISCONTINUED | OUTPATIENT
Start: 2021-11-30 | End: 2021-11-30 | Stop reason: ALTCHOICE

## 2021-11-30 RX ORDER — ALVIMOPAN 12 MG/1
12 CAPSULE ORAL ONCE
Status: DISCONTINUED | OUTPATIENT
Start: 2021-11-30 | End: 2021-11-30 | Stop reason: CLARIF

## 2021-11-30 RX ORDER — FENTANYL CITRATE 50 UG/ML
INJECTION, SOLUTION INTRAMUSCULAR; INTRAVENOUS PRN
Status: DISCONTINUED | OUTPATIENT
Start: 2021-11-30 | End: 2021-11-30 | Stop reason: SDUPTHER

## 2021-11-30 RX ORDER — ONDANSETRON 2 MG/ML
4 INJECTION INTRAMUSCULAR; INTRAVENOUS EVERY 4 HOURS PRN
Status: DISCONTINUED | OUTPATIENT
Start: 2021-11-30 | End: 2021-12-02 | Stop reason: HOSPADM

## 2021-11-30 RX ORDER — LIDOCAINE HYDROCHLORIDE 20 MG/ML
INJECTION, SOLUTION INTRAVENOUS PRN
Status: DISCONTINUED | OUTPATIENT
Start: 2021-11-30 | End: 2021-11-30 | Stop reason: SDUPTHER

## 2021-11-30 RX ORDER — PROPOFOL 10 MG/ML
INJECTION, EMULSION INTRAVENOUS PRN
Status: DISCONTINUED | OUTPATIENT
Start: 2021-11-30 | End: 2021-11-30 | Stop reason: SDUPTHER

## 2021-11-30 RX ORDER — ALVIMOPAN 12 MG/1
12 CAPSULE ORAL 2 TIMES DAILY
Status: DISCONTINUED | OUTPATIENT
Start: 2021-11-30 | End: 2021-11-30 | Stop reason: CLARIF

## 2021-11-30 RX ORDER — B-COMPLEX WITH VITAMIN C
TABLET ORAL
COMMUNITY

## 2021-11-30 RX ORDER — ONDANSETRON 2 MG/ML
4 INJECTION INTRAMUSCULAR; INTRAVENOUS EVERY 6 HOURS PRN
Status: DISCONTINUED | OUTPATIENT
Start: 2021-11-30 | End: 2021-12-02 | Stop reason: HOSPADM

## 2021-11-30 RX ORDER — SODIUM CHLORIDE 0.9 % (FLUSH) 0.9 %
5-40 SYRINGE (ML) INJECTION PRN
Status: DISCONTINUED | OUTPATIENT
Start: 2021-11-30 | End: 2021-12-02 | Stop reason: HOSPADM

## 2021-11-30 RX ORDER — GLYCOPYRROLATE 1 MG/5 ML
SYRINGE (ML) INTRAVENOUS PRN
Status: DISCONTINUED | OUTPATIENT
Start: 2021-11-30 | End: 2021-11-30 | Stop reason: SDUPTHER

## 2021-11-30 RX ORDER — SODIUM CHLORIDE 9 MG/ML
25 INJECTION, SOLUTION INTRAVENOUS PRN
Status: DISCONTINUED | OUTPATIENT
Start: 2021-11-30 | End: 2021-12-02 | Stop reason: HOSPADM

## 2021-11-30 RX ORDER — HYOSCYAMINE SULFATE 0.125 MG
125 TABLET,DISINTEGRATING ORAL EVERY 4 HOURS PRN
Status: DISCONTINUED | OUTPATIENT
Start: 2021-11-30 | End: 2021-12-02 | Stop reason: HOSPADM

## 2021-11-30 RX ORDER — HYDROCODONE BITARTRATE AND ACETAMINOPHEN 5; 325 MG/1; MG/1
2 TABLET ORAL EVERY 4 HOURS PRN
Status: DISCONTINUED | OUTPATIENT
Start: 2021-11-30 | End: 2021-12-02 | Stop reason: HOSPADM

## 2021-11-30 RX ORDER — HYDROMORPHONE HCL 110MG/55ML
PATIENT CONTROLLED ANALGESIA SYRINGE INTRAVENOUS PRN
Status: DISCONTINUED | OUTPATIENT
Start: 2021-11-30 | End: 2021-11-30 | Stop reason: SDUPTHER

## 2021-11-30 RX ORDER — ROCURONIUM BROMIDE 10 MG/ML
INJECTION, SOLUTION INTRAVENOUS PRN
Status: DISCONTINUED | OUTPATIENT
Start: 2021-11-30 | End: 2021-11-30 | Stop reason: SDUPTHER

## 2021-11-30 RX ORDER — DEXAMETHASONE SODIUM PHOSPHATE 10 MG/ML
INJECTION, EMULSION INTRAMUSCULAR; INTRAVENOUS PRN
Status: DISCONTINUED | OUTPATIENT
Start: 2021-11-30 | End: 2021-11-30 | Stop reason: SDUPTHER

## 2021-11-30 RX ORDER — HYDRALAZINE HYDROCHLORIDE 20 MG/ML
INJECTION INTRAMUSCULAR; INTRAVENOUS
Status: COMPLETED
Start: 2021-11-30 | End: 2021-11-30

## 2021-11-30 RX ORDER — INDOCYANINE GREEN AND WATER 25 MG
KIT INJECTION PRN
Status: DISCONTINUED | OUTPATIENT
Start: 2021-11-30 | End: 2021-11-30 | Stop reason: SDUPTHER

## 2021-11-30 RX ORDER — NEOSTIGMINE METHYLSULFATE 5 MG/5 ML
SYRINGE (ML) INTRAVENOUS PRN
Status: DISCONTINUED | OUTPATIENT
Start: 2021-11-30 | End: 2021-11-30 | Stop reason: SDUPTHER

## 2021-11-30 RX ORDER — SODIUM CHLORIDE 9 MG/ML
INJECTION, SOLUTION INTRAVENOUS CONTINUOUS
Status: DISCONTINUED | OUTPATIENT
Start: 2021-11-30 | End: 2021-12-02

## 2021-11-30 RX ORDER — MORPHINE SULFATE 2 MG/ML
2 INJECTION, SOLUTION INTRAMUSCULAR; INTRAVENOUS
Status: ACTIVE | OUTPATIENT
Start: 2021-11-30 | End: 2021-12-02

## 2021-11-30 RX ORDER — SODIUM CHLORIDE 0.9 % (FLUSH) 0.9 %
5-40 SYRINGE (ML) INJECTION EVERY 12 HOURS SCHEDULED
Status: DISCONTINUED | OUTPATIENT
Start: 2021-11-30 | End: 2021-12-02 | Stop reason: HOSPADM

## 2021-11-30 RX ORDER — MORPHINE SULFATE 2 MG/ML
4 INJECTION, SOLUTION INTRAMUSCULAR; INTRAVENOUS
Status: ACTIVE | OUTPATIENT
Start: 2021-11-30 | End: 2021-12-02

## 2021-11-30 RX ORDER — HYDROCODONE BITARTRATE AND ACETAMINOPHEN 5; 325 MG/1; MG/1
1 TABLET ORAL EVERY 4 HOURS PRN
Status: DISCONTINUED | OUTPATIENT
Start: 2021-11-30 | End: 2021-12-02 | Stop reason: HOSPADM

## 2021-11-30 RX ORDER — ONDANSETRON 2 MG/ML
INJECTION INTRAMUSCULAR; INTRAVENOUS PRN
Status: DISCONTINUED | OUTPATIENT
Start: 2021-11-30 | End: 2021-11-30 | Stop reason: SDUPTHER

## 2021-11-30 RX ORDER — MULTIVIT WITH MINERALS/LUTEIN
500 TABLET ORAL DAILY
COMMUNITY

## 2021-11-30 RX ORDER — SODIUM CHLORIDE 9 MG/ML
INJECTION, SOLUTION INTRAVENOUS CONTINUOUS
Status: DISCONTINUED | OUTPATIENT
Start: 2021-11-30 | End: 2021-11-30

## 2021-11-30 RX ORDER — ONDANSETRON 4 MG/1
4 TABLET, ORALLY DISINTEGRATING ORAL EVERY 8 HOURS PRN
Status: DISCONTINUED | OUTPATIENT
Start: 2021-11-30 | End: 2021-12-02 | Stop reason: HOSPADM

## 2021-11-30 RX ORDER — HYDRALAZINE HYDROCHLORIDE 20 MG/ML
10 INJECTION INTRAMUSCULAR; INTRAVENOUS EVERY 10 MIN PRN
Status: COMPLETED | OUTPATIENT
Start: 2021-11-30 | End: 2021-11-30

## 2021-11-30 RX ADMIN — PROPOFOL 200 MG: 10 INJECTION, EMULSION INTRAVENOUS at 08:17

## 2021-11-30 RX ADMIN — HYDRALAZINE HYDROCHLORIDE 10 MG: 20 INJECTION INTRAMUSCULAR; INTRAVENOUS at 10:52

## 2021-11-30 RX ADMIN — Medication 7.5 MG: at 09:39

## 2021-11-30 RX ADMIN — ONDANSETRON HYDROCHLORIDE 4 MG: 4 INJECTION, SOLUTION INTRAMUSCULAR; INTRAVENOUS at 09:40

## 2021-11-30 RX ADMIN — Medication 3 MG: at 10:11

## 2021-11-30 RX ADMIN — FENTANYL CITRATE 50 MCG: 50 INJECTION, SOLUTION INTRAMUSCULAR; INTRAVENOUS at 08:35

## 2021-11-30 RX ADMIN — FENTANYL CITRATE 50 MCG: 50 INJECTION, SOLUTION INTRAMUSCULAR; INTRAVENOUS at 08:17

## 2021-11-30 RX ADMIN — HYDROMORPHONE HYDROCHLORIDE 1 MG: 2 INJECTION INTRAMUSCULAR; INTRAVENOUS; SUBCUTANEOUS at 08:40

## 2021-11-30 RX ADMIN — SODIUM CHLORIDE: 9 INJECTION, SOLUTION INTRAVENOUS at 10:25

## 2021-11-30 RX ADMIN — HYDROCODONE BITARTRATE AND ACETAMINOPHEN 1 TABLET: 5; 325 TABLET ORAL at 21:56

## 2021-11-30 RX ADMIN — SODIUM CHLORIDE: 9 INJECTION, SOLUTION INTRAVENOUS at 06:42

## 2021-11-30 RX ADMIN — Medication 0.6 MG: at 10:11

## 2021-11-30 RX ADMIN — SODIUM CHLORIDE: 9 INJECTION, SOLUTION INTRAVENOUS at 19:36

## 2021-11-30 RX ADMIN — DEXAMETHASONE SODIUM PHOSPHATE 10 MG: 10 INJECTION, EMULSION INTRAMUSCULAR; INTRAVENOUS at 08:26

## 2021-11-30 RX ADMIN — ROCURONIUM BROMIDE 40 MG: 10 INJECTION INTRAVENOUS at 08:19

## 2021-11-30 RX ADMIN — NALOXEGOL OXALATE 25 MG: 25 TABLET, FILM COATED ORAL at 07:33

## 2021-11-30 RX ADMIN — HYDRALAZINE HYDROCHLORIDE 10 MG: 20 INJECTION INTRAMUSCULAR; INTRAVENOUS at 11:02

## 2021-11-30 RX ADMIN — ROCURONIUM BROMIDE 10 MG: 10 INJECTION INTRAVENOUS at 08:36

## 2021-11-30 RX ADMIN — LIDOCAINE HYDROCHLORIDE 80 MG: 20 INJECTION, SOLUTION INTRAVENOUS at 08:17

## 2021-11-30 RX ADMIN — CEFOXITIN 2000 MG: 2 INJECTION, POWDER, FOR SOLUTION INTRAVENOUS at 08:21

## 2021-11-30 ASSESSMENT — PULMONARY FUNCTION TESTS
PIF_VALUE: 25
PIF_VALUE: 17
PIF_VALUE: 25
PIF_VALUE: 13
PIF_VALUE: 18
PIF_VALUE: 24
PIF_VALUE: 1
PIF_VALUE: 19
PIF_VALUE: 24
PIF_VALUE: 21
PIF_VALUE: 14
PIF_VALUE: 24
PIF_VALUE: 13
PIF_VALUE: 24
PIF_VALUE: 18
PIF_VALUE: 3
PIF_VALUE: 23
PIF_VALUE: 24
PIF_VALUE: 24
PIF_VALUE: 2
PIF_VALUE: 19
PIF_VALUE: 13
PIF_VALUE: 25
PIF_VALUE: 25
PIF_VALUE: 24
PIF_VALUE: 14
PIF_VALUE: 25
PIF_VALUE: 26
PIF_VALUE: 0
PIF_VALUE: 23
PIF_VALUE: 26
PIF_VALUE: 24
PIF_VALUE: 25
PIF_VALUE: 23
PIF_VALUE: 26
PIF_VALUE: 20
PIF_VALUE: 18
PIF_VALUE: 25
PIF_VALUE: 20
PIF_VALUE: 13
PIF_VALUE: 18
PIF_VALUE: 14
PIF_VALUE: 24
PIF_VALUE: 26
PIF_VALUE: 1
PIF_VALUE: 18
PIF_VALUE: 25
PIF_VALUE: 13
PIF_VALUE: 3
PIF_VALUE: 14
PIF_VALUE: 25
PIF_VALUE: 23
PIF_VALUE: 25
PIF_VALUE: 24
PIF_VALUE: 1
PIF_VALUE: 19
PIF_VALUE: 25
PIF_VALUE: 25
PIF_VALUE: 1
PIF_VALUE: 23
PIF_VALUE: 24
PIF_VALUE: 0
PIF_VALUE: 18
PIF_VALUE: 2
PIF_VALUE: 3
PIF_VALUE: 14
PIF_VALUE: 24
PIF_VALUE: 14
PIF_VALUE: 23
PIF_VALUE: 26
PIF_VALUE: 1
PIF_VALUE: 23
PIF_VALUE: 1
PIF_VALUE: 24
PIF_VALUE: 25
PIF_VALUE: 24
PIF_VALUE: 24
PIF_VALUE: 1
PIF_VALUE: 25
PIF_VALUE: 24
PIF_VALUE: 21
PIF_VALUE: 14
PIF_VALUE: 17
PIF_VALUE: 25
PIF_VALUE: 18
PIF_VALUE: 20
PIF_VALUE: 26
PIF_VALUE: 24
PIF_VALUE: 19
PIF_VALUE: 23
PIF_VALUE: 20
PIF_VALUE: 19
PIF_VALUE: 20
PIF_VALUE: 24
PIF_VALUE: 19
PIF_VALUE: 18
PIF_VALUE: 23
PIF_VALUE: 14
PIF_VALUE: 18
PIF_VALUE: 23
PIF_VALUE: 13
PIF_VALUE: 20
PIF_VALUE: 25
PIF_VALUE: 3
PIF_VALUE: 17
PIF_VALUE: 13
PIF_VALUE: 24
PIF_VALUE: 25
PIF_VALUE: 1
PIF_VALUE: 25
PIF_VALUE: 14
PIF_VALUE: 20
PIF_VALUE: 0
PIF_VALUE: 14
PIF_VALUE: 12
PIF_VALUE: 24
PIF_VALUE: 2
PIF_VALUE: 13
PIF_VALUE: 25
PIF_VALUE: 20
PIF_VALUE: 3
PIF_VALUE: 3
PIF_VALUE: 23
PIF_VALUE: 12
PIF_VALUE: 12
PIF_VALUE: 24
PIF_VALUE: 25
PIF_VALUE: 15
PIF_VALUE: 25
PIF_VALUE: 23
PIF_VALUE: 25
PIF_VALUE: 25
PIF_VALUE: 24
PIF_VALUE: 26
PIF_VALUE: 14
PIF_VALUE: 23
PIF_VALUE: 23
PIF_VALUE: 1
PIF_VALUE: 20
PIF_VALUE: 14

## 2021-11-30 ASSESSMENT — PAIN SCALES - GENERAL
PAINLEVEL_OUTOF10: 0
PAINLEVEL_OUTOF10: 4
PAINLEVEL_OUTOF10: 0
PAINLEVEL_OUTOF10: 4
PAINLEVEL_OUTOF10: 1

## 2021-11-30 NOTE — PROGRESS NOTES
Pt returned to [facility] room 20. Vitals and assessment as charted. 0.9 infusing, @850ml to count from PACU. Pt has pop and water. Family at the bedside. Pt and family verbalized understanding of call light use. Call light in reach.

## 2021-11-30 NOTE — BRIEF OP NOTE
Brief Postoperative Note      Patient: Keaton Haque  YOB: 1950  MRN: [id number]    Date of Procedure: 11/30/2021    Pre-Op Diagnosis: Cecal/Ascending COLON POLYP    Post-Op Diagnosis: Same       Procedure: Robotic Right Colectomy    Surgeon(s):  Sintia Lai MD    Assistant:  First Assistant: Sveta Paul RN    Anesthesia: General/Local    Estimated Blood Loss (mL): 20 ml    Complications: None    Specimens:   ID Type Source Tests Collected by Time Destination   A : Right colon Tissue Colon SURGICAL PATHOLOGY Sintia Lai MD 11/30/2021 7313        Implants:  * No implants in log *      Drains:   Urethral Catheter Non-latex; Straight-tip;  Temperature probe 16 fr (Active)       Findings: as above - see op note for details    Electronically signed by Sintia Lai MD on 11/30/2021 at 10:10 AM

## 2021-11-30 NOTE — OP NOTE
[address] [place] [address]                                OPERATIVE REPORT    PATIENT NAME: Marylou Xiao                   :        1950  MED REC NO:   [id number]                           ROOM:  ACCOUNT NO:   [de-identified]                           ADMIT DATE: 2021  PROVIDER:     Favian Oshea M.D.    DATE OF PROCEDURE:  2021    PREOPERATIVE DIAGNOSIS:  Unresectable flat sessile polyp to  cecum/ascending colon. POSTOPERATIVE DIAGNOSIS:  Unresectable flat sessile polyp to  cecum/ascending colon. PROCEDURE:  Robotic right colectomy. SURGEON:  Favian Oshea MD    ASSISTANT:  WARREN Reed. ANESTHESIA:  General/local.    ESTIMATED BLOOD LOSS:  20 mL. DRAINS:  None. COMPLICATIONS:  None. DISPOSITION:  Stable to the recovery room. INDICATIONS:  The patient is a 67-year-old gentleman who I had seen in  the office secondary to an unresectable cecal/ascending flat sessile  polyp. Both operative and nonoperative intervention plans were  discussed. Risks of surgery were further discussed. Some of the risks  included but were not limited to bleeding, infection, the need for  reoperation, severe chronic postoperative pain or numbness, major  vascular or nerve injury, cardiopulmonary complications, anesthetic  complications, seroma/hematoma formation, wound breakdown, trocar site  herniation, anastomotic leak, anastomotic bleed, anastomotic stricture,  chronic pain and death. After all of the questions were answered in  their entirety and the patient was completely aware of the current  situation, he and family wished to proceed with surgery. DESCRIPTION OF PROCEDURE:  After informed consent was signed and placed  on the chart, the patient was taken back to the operating room and  placed supine on the operating room table. General anesthesia was  induced.   He tolerated this well tissue away from the retroperitoneal  structures. Right ureter was identified and protected throughout the  rest of the operation. Duodenum was identified and protected throughout  the operation. Hepatic flexure was mobilized. The proximal transverse  colon was then transected with an [place] DONAVON 60 blue load. Omentum was  divided there with a vessel sealer. Rest of the mesentery was then  taken which was only the right branch of the middle colic. This  completely freed the specimen. This was left in its natural position  and then the ileum was able to be brought up with very minimal  mobilization to the transverse colon. The tinea of the transverse colon  was then reapproximated back to the antimesenteric border of the ileum  with 3-0 Vicryl suture. Enterotomy and colotomy were then made. [place] DONAVON 60 blue load was brought through, closed, fired, and the  anastomosis was formed. Enterotomy was then closed with a running 3-0  V-Loc. This was reinforced with interrupted 3-0 Vicryl suture. 3 mL of  Firefly was given and ensured the anastomosis had good blood flow. Hemostasis was adequate. No twisting or torsion of the bowel and  mesentery. No undue tension. Vistaseal was then placed around the  anastomosis to ensure hemostasis and also to get some added support. No  other abnormal findings. Another 5-mm trocar was then placed there just  below the umbilicus. Grasper was used to grasp the staple line of the  terminal ileum. Instruments were removed. Robot was undocked and I  scrubbed back into the table. A small vertical incision was then made  around this 5-mm trocar and then the wound protector placed and the  specimen removed and sent to Pathology for permanent. The wound  protector was twisted upon itself and clamped with a Sarah, and the  abdomen re-insufflated. One last visualization demonstrated good  hemostasis and no other abnormal findings.   All the trocars were removed  including the wound protector site. The patient tolerated desufflation  well. The large trocar site was closed at the fascial level with Vicryl  suture. Wound protector site was then closed at the fascia with a  running #1 Stratafix. Care was taken to avoid bowel injury. Subcutaneous tissues were irrigated. Skin reapproximated at all the  incisional sites with 4-0 Vicryl in a subcuticular fashion. Closed  incisions were then cleaned, dried, and Steri-Strips applied. Dry  sterile dressings applied. Sponge, needle, and instrumentation count  was correct at the end of the procedure. The patient tolerated the  procedure well with no apparent complications and only about 20 mL of  blood loss. He was able to be brought out of general anesthesia and  transferred to postanesthesia care unit in stable condition.         Kosta Stevens M.D.    D: 11/30/2021 10:30:24       T: 11/30/2021 11:48:19     [email or web address]  Job#: [id number]     Doc#: [id number]    CC:

## 2021-11-30 NOTE — ANESTHESIA PRE PROCEDURE
Department of Anesthesiology  Preprocedure Note       Name:  Ryder العراقي   Age:  80 y.o.  :  1950                                          MRN:  [id number]         Date:  2021      Surgeon: Luci Echols):  Sammy Rowell MD    Procedure: Procedure(s):  ROBOT RIGHT COLECTOMY, POSS OPEN    Medications prior to admission:   Prior to Admission medications    Medication Sig Start Date End Date Taking? Authorizing Provider   Cholecalciferol (VITAMIN D3) 125 MCG (5000 UT) TABS Take by mouth   Yes Historical Provider, MD   Ascorbic Acid (VITAMIN C) 250 MG tablet Take 500 mg by mouth daily   Yes Historical Provider, MD   Zinc 100 MG TABS Take by mouth   Yes Historical Provider, MD   metroNIDAZOLE (FLAGYL) 500 MG tablet Take one tablet at 4pm, one tablet at 5pm and one tablet at 11pm the day prior to surgery. 21  Yes Sammy Rowell MD   meloxicam (MOBIC) 15 MG tablet Take 1 tablet by mouth daily 20  Yes CLARISSA Jimenez CNP   meloxicam [facility]) 15 MG tablet Take 1 tablet by mouth daily 20   CLARISSA Jimenez CNP       Current medications:    Current Facility-Administered Medications   Medication Dose Route Frequency Provider Last Rate Last Admin    0.9 % sodium chloride infusion   IntraVENous Continuous Nabila Cr  mL/hr at 32 0642 New Bag at 21 2318    cefOXitin (MEFOXIN) 2000 mg in dextrose 5% 50 mL (mini-bag)  2,000 mg IntraVENous Once Nabila Cr LPN           Allergies:  No Known Allergies    Problem List:    Patient Active Problem List   Diagnosis Code    Lower back pain M54.50    Elbow pain, right M25.521    Hip pain, left M25.552    Hip pain, right M25.551    Shoulder pain, left M25.512    Encounter for long-term (current) use of NSAIDs Z79.1    Colon polyp K63.5       Past Medical History:  History reviewed. No pertinent past medical history.     Past Surgical History:        Procedure Laterality Date    COLONOSCOPY  2021         ROTATOR CUFF REPAIR Right       and 2009    SHOULDER ARTHROPLASTY Left     [facility], [address] TOTAL HIP ARTHROPLASTY  2016    [facility]       Social History:    Social History     Tobacco Use    Smoking status: Former Smoker     Packs/day: 0.25     Years: 10.00     Pack years: 2.50     Types: Cigarettes     Quit date: 11/15/1992     Years since quittin.0    Smokeless tobacco: Never Used   Substance Use Topics    Alcohol use: Yes     Alcohol/week: 20.0 standard drinks     Types: 20 Standard drinks or equivalent per week     Comment: social                                Counseling given: Not Answered      Vital Signs (Current):   Vitals:    21 0616   BP: (!) 154/73   Pulse: 67   Resp: 16   Temp: 96.8 °F (36 °C)   TempSrc: Temporal   SpO2: 98%   Weight: 179 lb 9.6 oz (81.5 kg)   Height: 5' 11\" (1.803 m)                                              BP Readings from Last 3 Encounters:   21 (!) 154/73   21 134/70   21 118/76       NPO Status: Time of last liquid consumption:                         Time of last solid consumption:                         Date of last liquid consumption: 21                        Date of last solid food consumption: 21    BMI:   Wt Readings from Last 3 Encounters:   21 179 lb 9.6 oz (81.5 kg)   21 180 lb (81.6 kg)   21 181 lb (82.1 kg)     Body mass index is 25.05 kg/m².     CBC:   Lab Results   Component Value Date    WBC 5.7 2021    WBC 3.5 2012    RBC 4.47 2021    HGB 14.1 2021    HCT 41.4 2021    MCV 93 2021    RDW 12.8 2021     2021     2012       CMP:   Lab Results   Component Value Date     2019    K 4.4 2019     2019    CO2 29 2019    BUN 16 2019    CREATININE 0.97 2019    LABGLOM 86 2012    GLUCOSE 88 2019    PROT 6.6 2019    CALCIUM 9.4 2019    BILITOT 0.5 2019 ALKPHOS 68 12/18/2019    ALKPHOS 71 11/27/2012    AST 17 12/18/2019    ALT 19 12/18/2019       POC Tests: No results for input(s): POCGLU, POCNA, POCK, POCCL, POCBUN, POCHEMO, POCHCT in the last 72 hours. Coags: No results found for: PROTIME, INR, APTT    HCG (If Applicable): No results found for: PREGTESTUR, PREGSERUM, HCG, HCGQUANT     ABGs: No results found for: PHART, PO2ART, FKG3NJY, CTX1CJJ, BEART, R8ZWGZCY     Type & Screen (If Applicable):  Lab Results   Component Value Date    LABRH POS 11/30/2021       Drug/Infectious Status (If Applicable):  No results found for: HIV, HEPCAB    COVID-19 Screening (If Applicable): No results found for: COVID19        Anesthesia Evaluation  Patient summary reviewed no history of anesthetic complications:   Airway: Mallampati: II  TM distance: >3 FB   Neck ROM: full  Mouth opening: > = 3 FB Dental: normal exam         Pulmonary:normal exam                              ROS comment: Former smoker   Cardiovascular:                      Neuro/Psych:               GI/Hepatic/Renal:             Endo/Other:                     Abdominal:             Vascular: Other Findings:             Anesthesia Plan      general     ASA 3       Induction: intravenous. MIPS: Postoperative opioids intended and Prophylactic antiemetics administered. Anesthetic plan and risks discussed with patient.       Plan discussed with CRNA and surgical team.                  Pedro Orantes MD   11/30/2021

## 2021-11-30 NOTE — INTERVAL H&P NOTE
Update History & Physical    The patient's History and Physical was reviewed with the patient and I examined the patient. There was no change. The surgical site was confirmed by the patient and me. Plan: The risks, benefits, expected outcome, and alternative to the recommended procedure have been discussed with the patient. Patient understands and wants to proceed with the procedure. The patient was counseled at length about the risks of amrit Covid-19 during their perioperative period and any recovery window from their procedure. The patient was made aware that amrit Covid-19  may worsen their prognosis for recovering from their procedure  and lend to a higher morbidity and/or mortality risk. All material risks, benefits, and reasonable alternatives including postponing the procedure were discussed. The patient does wish to proceed with the procedure at this time.     Electronically signed by Claudell Ram, MD on 11/30/2021 at 6:33 AM

## 2021-11-30 NOTE — PROGRESS NOTES
[facility] with ex wife at bedside. Bed in low position side rails up call light in reach. Patient denies questions at this time.

## 2021-11-30 NOTE — ANESTHESIA POSTPROCEDURE EVALUATION
Department of Anesthesiology  Postprocedure Note    Patient: Jensen Lam  MRN: [id number]  YOB: 1950  Date of evaluation: 11/30/2021  Time:  3:44 PM     Procedure Summary     Date: 11/30/21 Room / Location: [facility] / [facility]    Anesthesia Start: 7938 Anesthesia Stop: 1034    Procedure: ROBOT RIGHT COLECTOMY (Right Abdomen) Diagnosis: (COLON POLYP)    Surgeons: Padmini Rivera MD Responsible Provider: Annette Finney MD    Anesthesia Type: general ASA Status: 3          Anesthesia Type: general    Ryan Phase I: Ryan Score: 8    Ryan Phase II: Ryan Score: 9    Last vitals: Reviewed and per EMR flowsheets. Anesthesia Post Evaluation    Patient location during evaluation: PACU  Patient participation: complete - patient participated  Level of consciousness: awake and alert  Airway patency: patent  Nausea & Vomiting: no nausea and no vomiting  Complications: no  Cardiovascular status: hemodynamically stable  Respiratory status: acceptable  Hydration status: euvolemic      ST. [address]  POST-ANESTHESIA NOTE       Name:  Jensen Lam                                         Age:  80 y.o.   MRN:  [id number]      Last Vitals:  BP (!) 159/71   Pulse 99   Temp 96.8 °F (36 °C) (Temporal)   Resp 16   Ht 5' 11\" (1.803 m)   Wt 179 lb 9.6 oz (81.5 kg)   SpO2 93%   BMI 25.05 kg/m²   Patient Vitals for the past 4 hrs:   BP Temp Temp src Pulse Resp SpO2   11/30/21 1436 (!) 159/71   99 16 93 %   11/30/21 1402 135/65   89 18 93 %   11/30/21 1232 (!) 146/68 96.8 °F (36 °C) Temporal 91 18 95 %   11/30/21 1152 (!) 152/69 98 °F (36.7 °C) Temporal 82 18 94 %       Level of Consciousness:  Awake    Respiratory:  Stable    Oxygen Saturation:  Stable    Cardiovascular:  Stable    Hydration:  Adequate    PONV:  Stable    Post-op Pain:  Adequate analgesia    Post-op Assessment:  No apparent anesthetic complications    Additional Follow-Up / Treatment / Comment:  None    ANTONIA SEGAL, MD  November 30, 2021   3:44 PM

## 2021-12-01 LAB
ANION GAP SERPL CALCULATED.3IONS-SCNC: 11 MEQ/L (ref 8–16)
BUN BLDV-MCNC: 8 MG/DL (ref 7–22)
CALCIUM SERPL-MCNC: 8.7 MG/DL (ref 8.5–10.5)
CHLORIDE BLD-SCNC: 106 MEQ/L (ref 98–111)
CO2: 24 MEQ/L (ref 23–33)
CREAT SERPL-MCNC: 0.9 MG/DL (ref 0.4–1.2)
GFR SERPL CREATININE-BSD FRML MDRD: 83 ML/MIN/1.73M2
GLUCOSE BLD-MCNC: 105 MG/DL (ref 70–108)
HCT VFR BLD CALC: 41 % (ref 42–52)
HEMOGLOBIN: 13.5 GM/DL (ref 14–18)
POTASSIUM REFLEX MAGNESIUM: 3.8 MEQ/L (ref 3.5–5.2)
SODIUM BLD-SCNC: 141 MEQ/L (ref 135–145)

## 2021-12-01 PROCEDURE — APPSS30 APP SPLIT SHARED TIME 16-30 MINUTES: Performed by: NURSE PRACTITIONER

## 2021-12-01 PROCEDURE — 36415 COLL VENOUS BLD VENIPUNCTURE: CPT

## 2021-12-01 PROCEDURE — 2580000003 HC RX 258: Performed by: NURSE PRACTITIONER

## 2021-12-01 PROCEDURE — 99024 POSTOP FOLLOW-UP VISIT: CPT | Performed by: NURSE PRACTITIONER

## 2021-12-01 PROCEDURE — 6360000002 HC RX W HCPCS: Performed by: SURGERY

## 2021-12-01 PROCEDURE — 80048 BASIC METABOLIC PNL TOTAL CA: CPT

## 2021-12-01 PROCEDURE — 1200000000 HC SEMI PRIVATE

## 2021-12-01 PROCEDURE — 2580000003 HC RX 258: Performed by: SURGERY

## 2021-12-01 PROCEDURE — 85014 HEMATOCRIT: CPT

## 2021-12-01 PROCEDURE — 6370000000 HC RX 637 (ALT 250 FOR IP): Performed by: SURGERY

## 2021-12-01 PROCEDURE — 85018 HEMOGLOBIN: CPT

## 2021-12-01 RX ADMIN — HYDROCODONE BITARTRATE AND ACETAMINOPHEN 2 TABLET: 5; 325 TABLET ORAL at 17:19

## 2021-12-01 RX ADMIN — HYDROCODONE BITARTRATE AND ACETAMINOPHEN 1 TABLET: 5; 325 TABLET ORAL at 08:50

## 2021-12-01 RX ADMIN — SODIUM CHLORIDE: 9 INJECTION, SOLUTION INTRAVENOUS at 05:30

## 2021-12-01 RX ADMIN — ENOXAPARIN SODIUM 40 MG: 40 INJECTION SUBCUTANEOUS at 08:51

## 2021-12-01 RX ADMIN — NALOXEGOL OXALATE 12.5 MG: 12.5 TABLET, FILM COATED ORAL at 08:50

## 2021-12-01 RX ADMIN — SODIUM CHLORIDE: 9 INJECTION, SOLUTION INTRAVENOUS at 22:13

## 2021-12-01 ASSESSMENT — PAIN SCALES - GENERAL
PAINLEVEL_OUTOF10: 7
PAINLEVEL_OUTOF10: 0
PAINLEVEL_OUTOF10: 2
PAINLEVEL_OUTOF10: 5
PAINLEVEL_OUTOF10: 2
PAINLEVEL_OUTOF10: 7

## 2021-12-01 NOTE — PROGRESS NOTES
Called report to Minal Yousif. Got cell phone from patient  [phone]. Had to leave message on voice mail.

## 2021-12-01 NOTE — PROGRESS NOTES
Pt up walked unit 2e tolerated well. No needs a this time.  Wife called back did acknowledge still in 2e

## 2021-12-01 NOTE — PROGRESS NOTES
Called carleen wife and informed them of change of room  Pt is now staying on 2e1.   Left message on voice mail

## 2021-12-01 NOTE — PROGRESS NOTES
[facility] - Dr. Huynh Lipoma Robson  Postoperative Progress Note    Pt Name: Case Murguia  Medical Record Number: [id number]  Date of Birth 1950   Today's Date: 2021    ASSESSMENT   1. POD # 1 Status post robotic right colectomy secondary to unresectable flat sessile polyp to cecum/ascending colon   has no past medical history on file. PLAN   1. Up to full liquids. Await bowel function. 2. Remove burks  3. Decrease fluids  4. Movantik  5. Up as tolerated  6. GI & DVT prophylaxis  7. Pain & nausea control as needed  8. Labs reviewed  9. Incisional care  10. Pathology pending  11. Clinically, looks good. Home when tolerating diet and bowel function returning. SUBJECTIVE   Patient was stable overnight. Chart reviewed. Updated by nursing staff. Denies chest discomfort or dyspnea. No N/V; (+) belching. No flatus or BM. Burks removed early and urinating without issues. Tolerating ADULT DIET; Clear Liquid diet. Pain controlled with analgesia. Up with assistance. Abdominal incisions are clean, dry and intact. Dry old bloody drainage to lower abdominal incision. CURRENT MEDICATIONS   Scheduled Meds:   sodium chloride flush  5-40 mL IntraVENous 2 times per day    enoxaparin  40 mg SubCUTAneous Daily    naloxegol  12.5 mg Oral QAM     Continuous Infusions:   sodium chloride      sodium chloride 100 mL/hr at 21 0530     PRN Meds:.hyoscyamine, sodium chloride flush, sodium chloride, ondansetron **OR** ondansetron, morphine **OR** morphine, HYDROcodone 5 mg - acetaminophen **OR** HYDROcodone 5 mg - acetaminophen, ondansetron  OBJECTIVE   CURRENT VITALS:  height is 5' 11\" (1.803 m) and weight is 179 lb 9.6 oz (81.5 kg). His oral temperature is 97.4 °F (36.3 °C). His blood pressure is 151/80 (abnormal) and his pulse is 72. His respiration is 16 and oxygen saturation is 95%.    Temperature Range (24h):Temp: 97.4 °F (36.3 °C) Temp  Av.2 °F (35.7 °C)  Min: 95 °F (35 °C) pending. Clinically, looks good this morning.     Electronically signed by Lisa Kauffman MD on 12/1/21 at 3:22 PM EST

## 2021-12-01 NOTE — PROGRESS NOTES
1000 pt was in bed with eyes closed. Appeared to be sleeping. Iv site was observed. 1103 pt was in bed with eyes closed. Appeared to be sleeping.

## 2021-12-01 NOTE — PROGRESS NOTES
Patient resting in bed. He states that he does not want any pain meds until after the basketball game so that he can take them and just go to sleep. No needs or concerns voiced at this time. Bed low and locked. Call light in reach.

## 2021-12-01 NOTE — PROGRESS NOTES
Assisted pt with bath. States \"feels so much better\" pt walked in hallway around nursing station. Abdomen soft bowel sounds present    1310 Sara Ellis here said pt will be able to go on regular diet tomorrow.

## 2021-12-01 NOTE — CARE COORDINATION
12/1/21, 7:48 AM EST  DISCHARGE PLANNING EVALUATION:    Keaton Haque       Admitted: 11/30/2021/ [facility] day: 1   Location: 2E-01/001-A Reason for admit: Colon polyp [K63.5]   PMH:  has no past medical history on file. Procedure: 11-30-21 Robotic right colectomy. Barriers to Discharge:  Admitted to [facility] for above procedure. Marquez to be pulled. Full liquid diet. Abd binder. PCP: Odilon Keene MD  Readmission Risk Score: 5 ( )%    Patient Goals/Plan/Treatment Preferences: Met with pt today. He is from home alone. No services or DME. No transportation issues. He has a PCP and no problems getting meds. Denies needs. Transportation/Food Security/Housekeeping Addressed:  No issues identified.

## 2021-12-02 VITALS
DIASTOLIC BLOOD PRESSURE: 82 MMHG | WEIGHT: 179.6 LBS | TEMPERATURE: 98.5 F | HEIGHT: 71 IN | HEART RATE: 82 BPM | OXYGEN SATURATION: 94 % | RESPIRATION RATE: 16 BRPM | BODY MASS INDEX: 25.15 KG/M2 | SYSTOLIC BLOOD PRESSURE: 128 MMHG

## 2021-12-02 LAB
ANION GAP SERPL CALCULATED.3IONS-SCNC: 14 MEQ/L (ref 8–16)
BUN BLDV-MCNC: 9 MG/DL (ref 7–22)
CALCIUM SERPL-MCNC: 9 MG/DL (ref 8.5–10.5)
CHLORIDE BLD-SCNC: 103 MEQ/L (ref 98–111)
CO2: 22 MEQ/L (ref 23–33)
CREAT SERPL-MCNC: 0.8 MG/DL (ref 0.4–1.2)
GFR SERPL CREATININE-BSD FRML MDRD: > 90 ML/MIN/1.73M2
GLUCOSE BLD-MCNC: 105 MG/DL (ref 70–108)
HCT VFR BLD CALC: 41.4 % (ref 42–52)
HEMOGLOBIN: 13.7 GM/DL (ref 14–18)
POTASSIUM SERPL-SCNC: 3.8 MEQ/L (ref 3.5–5.2)
SODIUM BLD-SCNC: 139 MEQ/L (ref 135–145)

## 2021-12-02 PROCEDURE — 80048 BASIC METABOLIC PNL TOTAL CA: CPT

## 2021-12-02 PROCEDURE — 99024 POSTOP FOLLOW-UP VISIT: CPT | Performed by: NURSE PRACTITIONER

## 2021-12-02 PROCEDURE — 6370000000 HC RX 637 (ALT 250 FOR IP): Performed by: SURGERY

## 2021-12-02 PROCEDURE — 85014 HEMATOCRIT: CPT

## 2021-12-02 PROCEDURE — 36415 COLL VENOUS BLD VENIPUNCTURE: CPT

## 2021-12-02 PROCEDURE — 6360000002 HC RX W HCPCS: Performed by: SURGERY

## 2021-12-02 PROCEDURE — 2580000003 HC RX 258: Performed by: SURGERY

## 2021-12-02 PROCEDURE — 85018 HEMOGLOBIN: CPT

## 2021-12-02 RX ORDER — HYDROCODONE BITARTRATE AND ACETAMINOPHEN 5; 325 MG/1; MG/1
1-2 TABLET ORAL EVERY 6 HOURS PRN
Qty: 25 TABLET | Refills: 0 | Status: SHIPPED | OUTPATIENT
Start: 2021-12-02 | End: 2021-12-09

## 2021-12-02 RX ADMIN — HYDROCODONE BITARTRATE AND ACETAMINOPHEN 2 TABLET: 5; 325 TABLET ORAL at 04:38

## 2021-12-02 RX ADMIN — HYDROCODONE BITARTRATE AND ACETAMINOPHEN 1 TABLET: 5; 325 TABLET ORAL at 00:35

## 2021-12-02 RX ADMIN — ENOXAPARIN SODIUM 40 MG: 40 INJECTION SUBCUTANEOUS at 09:23

## 2021-12-02 RX ADMIN — NALOXEGOL OXALATE 12.5 MG: 12.5 TABLET, FILM COATED ORAL at 09:25

## 2021-12-02 RX ADMIN — SODIUM CHLORIDE, PRESERVATIVE FREE 10 ML: 5 INJECTION INTRAVENOUS at 09:25

## 2021-12-02 ASSESSMENT — PAIN SCALES - GENERAL
PAINLEVEL_OUTOF10: 4
PAINLEVEL_OUTOF10: 0
PAINLEVEL_OUTOF10: 4

## 2021-12-02 NOTE — PROGRESS NOTES
Up in the vivar with assist to the rooftop garden and back. Tolerated well. Sitting on bedside, watching TV.

## 2021-12-02 NOTE — PLAN OF CARE
Problem: Falls - Risk of:  Goal: Will remain free from falls  Description: Will remain free from falls  Outcome: Ongoing  Note: Patient uses call light appropriately when needing help from staff.

## 2021-12-02 NOTE — PROGRESS NOTES
[facility] - Dr. Rafi Chance  Postoperative Progress Note    Pt Name: Rachel Duncan  Medical Record Number: [id number]  Date of Birth 1950   Today's Date: 2021    ASSESSMENT   1. POD # 2 Status post robotic right colectomy secondary to unresectable flat sessile polyp to cecum/ascending colon   has no past medical history on file. PLAN   1. Increase up to soft diet today. Bowel function retuning. 2. Urinating well  3. IV to INT  4. Movantik  5. Up as tolerated  6. GI & DVT prophylaxis  7. Pain & nausea control as needed  8. Labs reviewed  9. Incisional care  10. Pathology pending  11. Looks really good. Planning home after lunch if continuing to do well. Follow up in 10 days. Questions answered. Wife present. SUBJECTIVE   Patient was stable overnight. Chart reviewed. Updated by nursing staff. Denies chest discomfort or dyspnea. No N/V; (+) belching. Passing flatus and had two small loose BMs so far. Urinating without issues. Tolerating ADULT DIET; Dysphagia - Soft and Bite Sized diet. Pain controlled with analgesia. Up with assistance. Abdominal incisions are clean, dry and intact. CURRENT MEDICATIONS   Scheduled Meds:   sodium chloride flush  5-40 mL IntraVENous 2 times per day    enoxaparin  40 mg SubCUTAneous Daily    naloxegol  12.5 mg Oral QAM     Continuous Infusions:   sodium chloride       PRN Meds:.hyoscyamine, sodium chloride flush, sodium chloride, ondansetron **OR** ondansetron, morphine **OR** morphine, HYDROcodone 5 mg - acetaminophen **OR** HYDROcodone 5 mg - acetaminophen, ondansetron  OBJECTIVE   CURRENT VITALS:  height is 5' 11\" (1.803 m) and weight is 179 lb 9.6 oz (81.5 kg). His oral temperature is 98.5 °F (36.9 °C). His blood pressure is 136/66 and his pulse is 73. His respiration is 18 and oxygen saturation is 93%.    Temperature Range (24h):Temp: 98.5 °F (36.9 °C) Temp  Av.7 °F (36.5 °C)  Min: 96.9 °F (36.1 °C)  Max: 98.5 °F (36.9 depending how he progresses but clinically looks really good this morning.     Electronically signed by Kathryn Allred MD on 12/2/21 at 12:01 PM EST

## 2021-12-02 NOTE — CARE COORDINATION
Discharge Planning Update: Following post colectomy. Pt walking halls. Tolerating diet, increase to soft today. +flatus and small loose stool. Pain controlled. 12/2/21, 12:34 PM EST    Patient goals/plan/ treatment preferences discussed by  and . Patient goals/plan/ treatment preferences reviewed with patient/ family. Patient/ family verbalize understanding of discharge plan and are in agreement with goal/plan/treatment preferences. Understanding was demonstrated using the teach back method. AVS provided by RN at time of discharge, which includes all necessary medical information pertaining to the patients current course of illness, treatment, post-discharge goals of care, and treatment preferences. IMM Letter  IMM Letter given to Patient/Family/Significant other/Guardian/POA/by[de-identified] 12-1-21  IMM Letter date given[de-identified] 12/01/21  IMM Letter time given[de-identified] [id number]       Possible discharge later today or tomorrow. Pt plans return home alone with no discharge needs voiced.

## 2021-12-02 NOTE — PROGRESS NOTES
Incisions remain dry et intact. Pt eating and drinking well. BM x 2 today. Denies pain. Discharge criteria have been met. Instructions given to pt and family who verbalize understanding. Discharged per w/c in satisfactory condition.

## 2021-12-06 ENCOUNTER — TELEPHONE (OUTPATIENT)
Dept: SURGERY | Age: 71
End: 2021-12-06

## 2021-12-06 DIAGNOSIS — C18.9 COLON ADENOCARCINOMA (HCC): Primary | ICD-10-CM

## 2021-12-06 NOTE — DISCHARGE SUMMARY
Frances Valdivia [address] [facility]       Pt Name: Kendra Rubio  MRN: [id number]  YOB: 1950  Primary Care Physician: Trish Resendez MD    Admit date:  2021  5:49 AM      Discharge date:  2021  3:07 PM    Admitting Diagnosis:   1. Unresectable flat sessile polyp to cecum/ascending colon    Discharge Diagnosis:   1. S/P laparoscopic colectomy    2.    Invasive adenocarcinoma, moderately-differentiated    Admitting Service: General Surgery, Yohan Mendez MD.    Consultants:  none    History and Physical:  Tania Allen (:  1950)      ASSESSMENT:  1.  Unresectable cecal adenoma polyp  2.  History of multiple polyps cecum/ascending colon     PLAN:  1. Schedule Aj for robotic right colectomy. 2. He will undergo pre-operative clearance per anesthesia guidelines with risk factors listed under the past medical history diagnosis & problem list.  3. The risks, benefits and alternatives were discussed with Aj including non-operative management.  The pros and cons of robotic, laparoscopic and open techniques were discussed.  All questions answered. He understands and wishes to proceed with surgical intervention. 4. Restrictions discussed with Aj and he expresses understanding.   5. He is advised to call back directly if there are further questions/concerns, or if his symptoms worsen prior to surgery.     SUBJECTIVE/OBJECTIVE:             Chief Complaint   Patient presents with    Surgical Consult       New patient ref Dr. Lorene Cardenas colonoscopy -- cecum polyp       HPI  Valarie De La Rosa is a 71-year-old male who presents for evaluation of unresectable cecal polyp.  Pathology demonstrated tubular adenoma.  Flat carpet-like lesion.  History of multiple previous polyps removed especially in the cecum and ascending colon.  Patient denies any unexplained weight loss.  No fever, chills or sweats.  Denies abdominal pain or tenderness.  No abdominal bloating/distention.  No chest pain or shortness of breath.  No change in bowel function.  No hematochezia or melena.  No new urinary complaints.  Tolerating diet.  Denies history of major abdominal surgery.     Review of Systems   Constitutional: Negative for activity change, appetite change, chills, diaphoresis, fatigue, fever and unexpected weight change. HENT: Negative for congestion, dental problem, drooling, ear discharge, ear pain, facial swelling, hearing loss, mouth sores, nosebleeds, postnasal drip, rhinorrhea, sinus pressure, sneezing, sore throat, tinnitus, trouble swallowing and voice change.    Eyes: Negative for photophobia, pain, discharge, redness, itching and visual disturbance. Respiratory: Negative for apnea, cough, choking, chest tightness, shortness of breath, wheezing and stridor.    Cardiovascular: Negative for chest pain, palpitations and leg swelling. Gastrointestinal: Negative for abdominal distention, abdominal pain, anal bleeding, blood in stool, constipation, diarrhea, nausea, rectal pain and vomiting. Endocrine: Negative.    Genitourinary: Negative for decreased urine volume, difficulty urinating, discharge, dysuria, enuresis, flank pain, frequency, genital sores, hematuria, penile pain, penile swelling, scrotal swelling, testicular pain and urgency. Musculoskeletal: Negative for arthralgias, back pain, gait problem, joint swelling, myalgias, neck pain and neck stiffness. Skin: Negative for color change, pallor, rash and wound. Allergic/Immunologic: Negative.    Neurological: Negative for dizziness, tremors, seizures, syncope, facial asymmetry, speech difficulty, weakness, light-headedness, numbness and headaches. Hematological: Negative for adenopathy. Does not bruise/bleed easily.    Psychiatric/Behavioral: Negative for agitation, behavioral problems, confusion, decreased concentration, dysphoric mood, hallucinations, self-injury, sleep disturbance and suicidal ideas. The patient is not nervous/anxious and is not hyperactive.          Past Medical History   History reviewed. No pertinent past medical history.         Past Surgical History             Past Surgical History:   Procedure Laterality Date    COLONOSCOPY   2021     Dr. Kelsey Bishop and 2009    SHOULDER ARTHROPLASTY Left       [place]    TOTAL HIP ARTHROPLASTY   2016     [facility]            Current Facility-Administered Medications               Current Outpatient Medications   Medication Sig Dispense Refill    meloxicam (MOBIC) 15 MG tablet Take 1 tablet by mouth daily 90 tablet 3      No current facility-administered medications for this visit.            No Known Allergies     Family History             Family History   Problem Relation Age of Onset    Breast Cancer Mother              Social History                   Socioeconomic History    Marital status:        Spouse name: Not on file    Number of children: 2    Years of education: Not on file    Highest education level: Some college, no degree   Occupational History    Not on file   Tobacco Use    Smoking status: Former Smoker       Packs/day: 0.25       Years: 10.00       Pack years: 2.50       Types: Cigarettes       Quit date: 11/15/1992       Years since quittin.8    Smokeless tobacco: Never Used   Substance and Sexual Activity    Alcohol use:  Yes       Alcohol/week: 20.0 standard drinks       Types: 20 Standard drinks or equivalent per week       Comment: social    Drug use: No    Sexual activity: Not Currently   Other Topics Concern    Not on file   Social History Narrative    Not on file      Social Determinants of Health            Financial Resource Strain: Low Risk     Difficulty of Paying Living Expenses: Not very hard   Food Insecurity: No Food Insecurity    Worried About Running Out of Food in the Last Year: Never true    Barbra of Food in the normal. There is no distension.      Palpations: Abdomen is soft. There is no mass.      Tenderness: There is no abdominal tenderness. There is no guarding or rebound. Musculoskeletal:         General: No tenderness. Normal range of motion.      Cervical back: Normal range of motion and neck supple. Skin:     General: Skin is warm and dry.      Coloration: Skin is not pale.      Findings: No erythema or rash. Neurological:      Mental Status: He is alert and oriented to person, place, and time.      Cranial Nerves: No cranial nerve deficit. Psychiatric:         BehaviorLuz Sell     Thought Content:  Thought content normal.         Judgment: Judgment normal.                   Lab Results   Component Value Date     WBC 4.1 (L) 2019     HGB 14.5 2019     HCT 43.1 2019     MCV 93 2019      2019                Lab Results   Component Value Date      2019     K 4.4 2019      2019     CO2 29 2019                Lab Results   Component Value Date     CREATININE 0.97 2019                Lab Results   Component Value Date     ALT 19 2019     AST 17 2019     ALKPHOS 68 2019     BILITOT 0.5 2019      No results found for: LIPASE           Patient Active Problem List   Diagnosis    Lower back pain    Elbow pain, right    Hip pain, left    Hip pain, right    Shoulder pain, left    Encounter for long-term (current) use of NSAIDs                                          An electronic signature was used to authenticate this note.     --Nadeen Garza MD     Procedures/Diagnostic Tests:          OPERATIVE REPORT     PATIENT NAME: Antoni Noyola                   :        1950  MED REC NO:   [id number]                           ROOM:  ACCOUNT NO:   [id number]                           ADMIT DATE: 2021  PROVIDER:     Bibiana Martin M.D.     DATE OF PROCEDURE: 11/30/2021     PREOPERATIVE DIAGNOSIS:  Unresectable flat sessile polyp to  cecum/ascending colon.     POSTOPERATIVE DIAGNOSIS:  Unresectable flat sessile polyp to  cecum/ascending colon.     PROCEDURE:  Robotic right colectomy.     SURGEON:  Robert Matta MD     ASSISTANT:  WARREN Mathews.     ANESTHESIA:  General/local.     ESTIMATED BLOOD LOSS:  20 mL.     DRAINS:  None.     COMPLICATIONS:  None.     DISPOSITION:  Stable to the recovery room.     INDICATIONS:  The patient is a 71-year-old gentleman who I had seen in  the office secondary to an unresectable cecal/ascending flat sessile  polyp. Both operative and nonoperative intervention plans were  discussed. Risks of surgery were further discussed. Some of the risks  included but were not limited to bleeding, infection, the need for  reoperation, severe chronic postoperative pain or numbness, major  vascular or nerve injury, cardiopulmonary complications, anesthetic  complications, seroma/hematoma formation, wound breakdown, trocar site  herniation, anastomotic leak, anastomotic bleed, anastomotic stricture,  chronic pain and death. After all of the questions were answered in  their entirety and the patient was completely aware of the current  situation, he and family wished to proceed with surgery.     DESCRIPTION OF PROCEDURE:  After informed consent was signed and placed  on the chart, the patient was taken back to the operating room and  placed supine on the operating room table. General anesthesia was  induced. He tolerated this well throughout the case. All pressure  points were padded. He was on preoperative antibiotics. Bilateral  lower extremity sequential compression devices were placed prior to  incision. His abdomen and pelvis were all prepped and draped in the  usual sterile standard fashion. A timeout occurred prior to the  operation, which not only identified the patient but also the planned  procedure to be performed.   At the end of the timeout, there were no  questions or concerns.     I began the operation by making a small skin nick at Oneal's point. Veress needle inserted. Intraabdominal cavity was insufflated to a  pressure of approximately 15 mmHg with carbon dioxide gas. The patient  tolerated the insufflation well. An 8-mm trocar was then placed on the  left lateral abdominal wall. Laparoscope was inserted. Upon initial  evaluation, there was no hollow viscus, solid organ, or major vascular  injury with the Veress needle insertion or the first trocar placement. Three other 8 mm trocars as well as the assistant 12-mm trocar were all  placed in their standard location there on the left side of the abdomen  under direct vision. He was then placed with the right side elevated  and then slight reverse Trendelenburg. Robot was brought in and docked. Instruments placed under direct vision. Once everything was aligned and  in order, I then unscrubbed and went back to the console. I began the  operation by evaluating the entire abdomen. There appeared to be no  kind of abnormalities or concern for malignancy. Cecum was identified  and there in the cecum in the proximal ascending colon the [place] ink . Terminal ileum was mobilized and then transected with an [place] DONAVON 60  blue load. Base of the mesentery was then scored and then taken with  the vessel sealer. Cecum and appendix were all completely freed up and  then I continued dissection to a superolateral aspect  the  colon mesentery with its vilma tissue away from the retroperitoneal  structures. Right ureter was identified and protected throughout the  rest of the operation. Duodenum was identified and protected throughout  the operation. Hepatic flexure was mobilized. The proximal transverse  colon was then transected with an [place] DONAVON 60 blue load. Omentum was  divided there with a vessel sealer.   Rest of the mesentery was then  taken which was only the right branch of the middle colic. This  completely freed the specimen. This was left in its natural position  and then the ileum was able to be brought up with very minimal  mobilization to the transverse colon. The tinea of the transverse colon  was then reapproximated back to the antimesenteric border of the ileum  with 3-0 Vicryl suture. Enterotomy and colotomy were then made. [place] DONAVON 60 blue load was brought through, closed, fired, and the  anastomosis was formed. Enterotomy was then closed with a running 3-0  V-Loc. This was reinforced with interrupted 3-0 Vicryl suture. 3 mL of  Firefly was given and ensured the anastomosis had good blood flow. Hemostasis was adequate. No twisting or torsion of the bowel and  mesentery. No undue tension. Vistaseal was then placed around the  anastomosis to ensure hemostasis and also to get some added support. No  other abnormal findings. Another 5-mm trocar was then placed there just  below the umbilicus. Grasper was used to grasp the staple line of the  terminal ileum. Instruments were removed. Robot was undocked and I  scrubbed back into the table. A small vertical incision was then made  around this 5-mm trocar and then the wound protector placed and the  specimen removed and sent to Pathology for permanent. The wound  protector was twisted upon itself and clamped with a Sarah, and the  abdomen re-insufflated. One last visualization demonstrated good  hemostasis and no other abnormal findings. All the trocars were removed  including the wound protector site. The patient tolerated desufflation  well. The large trocar site was closed at the fascial level with Vicryl  suture. Wound protector site was then closed at the fascia with a  running #1 Stratafix. Care was taken to avoid bowel injury. Subcutaneous tissues were irrigated. Skin reapproximated at all the  incisional sites with 4-0 Vicryl in a subcuticular fashion. Closed  incisions were then cleaned, dried, and Steri-Strips applied. Dry  sterile dressings applied. Sponge, needle, and instrumentation count  was correct at the end of the procedure. The patient tolerated the  procedure well with no apparent complications and only about 20 mL of  blood loss. He was able to be brought out of general anesthesia and  transferred to postanesthesia care unit in stable condition.           Aryan Glynn M.D. Hospital Course: Carlos Vazquez is a 77-year old male patient who was taken to the operative suite per Waldron Severance MD and the planned procedure was performed as noted above. He was admitted to  for postoperative care and was initially managed with bowel rest, analgesics for pain control, IV fluid hydration, GI and DVT prophylaxis. Over the hospital stay he readily improved in ability to tolerate increasing levels of activity, take po fluids and solid foods with evidence of returning bowel function, and able to void on his own accord. Pain controlled with pain medication. Discharge Condition: stable    Disposition: home    Labs:  Lab Results   Component Value Date    WBC 5.7 09/23/2021    HGB 13.7 (L) 12/02/2021    HCT 41.4 (L) 12/02/2021    MCV 93 09/23/2021     09/23/2021     Lab Results   Component Value Date     12/02/2021    K 3.8 12/02/2021    K 3.8 12/01/2021     12/02/2021    CO2 22 12/02/2021    BUN 9 12/02/2021    CREATININE 0.8 12/02/2021    GLUCOSE 105 12/02/2021    GLUCOSE 88 12/18/2019    CALCIUM 9.0 12/02/2021        PATHOLOGY REPORT     Clinical Information: COLON POLYP     ADDENDUM  12/6/21     FINAL DIAGNOSIS:   Colon, right hemicolectomy:             Invasive adenocarcinoma, moderately-differentiated, with   invasion into the       submucosa, 0.2 cm in greatest dimension, stage pT1 N0.    Margins negative for carcinoma.    Twelve lymph nodes, negative for carcinoma (0/12).     Melanosis coli.       Mild fibrous adhesions.       Acute appendicitis.    Unremarkable omentum.    Unremarkable small bowel mucosa. ADDENDUM 12/6/21   Immunohistochemistry Studies for Mismatch Repair Proteins   MLH1        Intact nuclear positivity, tumor cells   MSH2        Intact nuclear positivity, tumor cells   MSH6        Intact nuclear positivity, tumor cells   PMS2        Intact nuclear positivity, tumor cells     *Background nonneoplastic tissue/internal control with intact nuclear   expression. IHC Interpretation    No loss of nuclear expression of MMR proteins: low probability of   microsatellite instability-high (MSI-H)#      # - There are exceptions to the above IHC interpretations. Lawanda Baumgarten   results should not be considered in isolation, and clinical correlation   with genetic counseling is recommended to assess the need for germline   testing. Specimen:   RIGHT COLON       Gross Examination:   The container is labeled Raulito Ware, right colon.  Received fresh   is a segmental resection of bowel including terminal ileum, cecum, and   right colon.  The specimen measures 20 cm in length including 5 cm of   terminal ileum.  There is an appendix measuring about 8 cm in length x   0.8 cm in diameter.  Sections through the appendix reveal thickened   mucosa.  The specimen is surrounded by a shaggy yellow adipose tissue. There is an attached sheet of omental fat. Hollace Gondola is [place] ink   tattooing on the surface of the cecum.  The specimen is opened   longitudinally revealing a tan mucosal surface. Hollace Gondola is an area of   mucosal thickening in the cecum measuring about 1.5 cm.  This area is   about 8 cm from the proximal resection line and 10.5 cm from the   mesenteric margin.  There are no large ulcerated or fungating masses.    The attached sheet of omental fat measures 32 x 10 x about 2 cm.  No   discrete lesions.  Sections through the surrounding adipose tissue   reveal several tentatively identified lymph nodes.  Representative   sections are submitted. Graeme Vaibhav #1 - proximal and distal resection   lines; cassette #2 - mesenteric margin; cassettes #3, #4, and #5 -   appendix; cassette #6 - area of mucosal thickening and cecum; cassette   #7 - uninvolved right colon; cassette #8 - omental fat; and cassettes   #9 and #10 - tentatively identified lymph nodes.  ss.    SIO/DKR:[id number]       Microscopic Examination:   Procedure   Right hemicolectomy     Tumor site   Cecum     Histologic type   Adenocarcinoma     Histologic grade   G2 moderately differentiated     Tumor size   Greatest dimension in centimeters: 0.2 cm   Additional dimensions: 0.2 cm     Tumor extent   Invades submucosa     Macroscopic tumor perforation   Not identified     Lymphovascular invasion   Not identified     Perineural invasion   Not identified     Number of tumor buds   Specify number and one hotspot field: 0   Tumor bud score   Low     Type of polyp in which invasive carcinoma arose   Tubular adenoma     Treatment effect   No known presurgical therapy     Margins   Margin status for invasive carcinoma   All margins negative for invasive carcinoma   Closest margin to invasive carcinoma: Proximal   Distance from invasive carcinoma to closest margin: 8 cm     Regional lymph nodes   Regional lymph nodes present   All regional lymph nodes negative for tumor   Number of lymph nodes examined   Exact number: 12     Tumor deposits   Not identified     Pathologic stage classification (pTMN) (AJCC 8th edition)   pT Category   pT1: Tumor invades the submucosa (through the muscularis mucosa but not   through the musculus propria)     pN Category   pN0: No regional lymph node metastases     Additional findings   Tubular adenoma with high-grade dysplasia   Melanosis coli   Fibrous adhesions   Acute appendicitis   Unremarkable omentum   Unremarkable small bowel mucosa     Ancillary testing   Immunohistochemistry for mismatch repair proteins will be performed on   block A6 and the results will be reported as an addendum. *This test was developed and its performance characteristics determined   by [address] [place] [address] has not been cleared or   approved by the U.S. Food and Drug Administration. Pursuant to the   requirements of CLIA, this laboratory has established and verified the   test's accuracy and precision.  Additional information about this type   of test is available upon request.     Discharge Medications:        Medication List      START taking these medications    HYDROcodone-acetaminophen 5-325 MG per tablet  Commonly known as: NORCO  Take 1-2 tablets by mouth every 6 hours as needed for Pain for up to 7 days. CONTINUE taking these medications    meloxicam 15 MG tablet  Commonly known as: Mobic  Take 1 tablet by mouth daily     vitamin C 250 MG tablet     Vitamin D3 125 MCG (5000 UT) Tabs     Zinc 100 MG Tabs        STOP taking these medications    metroNIDAZOLE 500 MG tablet  Commonly known as: FLAGYL           Where to Get Your Medications      These medications were sent to [address] [place] [address] [address] - [place], [address] [facility] [phone] [address], [place] [address]    Phone: [phone]   · HYDROcodone-acetaminophen 5-325 MG per tablet         Diet: General/Regular diet as tolerated    Activity: no lifting more than 10-20 lbs for next two weeks    Wound Care: as directed     Follow-up:  in the next few weeks with Christian Noyola MD  Follow up with CLARISSA Guzman CNP in 1-2 weeks.     CLARISSA Guzman CNP, CNP   Electronincally signed 12/6/2021 at 9:39 AM    A total of 35 minutes was spent in preparing the patient for discharge with greater than 50% of the time involved with education, counseling and coordinating care

## 2021-12-06 NOTE — TELEPHONE ENCOUNTER
Pathology results discussed with patient. Questions answered. Referral to Dr. Claudean Held requested. No other concerns or issues. States he is feeling good. Follow up appointment with us as already scheduled.

## 2021-12-08 NOTE — TELEPHONE ENCOUNTER
Andrew Carmen at Dr. Corona Mediate called to advise that Dixie Yusuf will not need any treatment. As the patient will be gone until around 01-, they will see him after then.

## 2021-12-09 NOTE — PROGRESS NOTES
Physician Progress Note      PATIENT:               Keon Joseph  CSN #:                  [id number]  :                       1950  ADMIT DATE:       2021 5:49 AM  DISCH DATE:        2021 3:07 PM  RESPONDING  PROVIDER #:        Bonita Chino MD          QUERY TEXT:    Patient admitted for laparoscopic colectomy  Noted documentation of Acute   Appendicitis in pathology report in DC summary. Please document if the   diagnosis of Acute Appendicitis has been ruled in or  ruled out after further   study. The medical record reflects the following:  Risk Factors: Elderly  Clinical Indicators: Per path report- Acute appendicitis documented  Treatment: Monitoring    Thank You! Mulu Borja RN, CRCR  RN Clinical   (I) [phone] (x) [phone]  Options provided:  -- Acute Appendicitis ruled in  -- Acute Appendicitis was ruled out  -- Other - I will add my own diagnosis  -- Disagree - Not applicable / Not valid  -- Disagree - Clinically unable to determine / Unknown  -- Refer to Clinical Documentation Reviewer    PROVIDER RESPONSE TEXT:    Acute Appendicitis was ruled out after study.     Query created by: Isra Latham on 2021 10:23 AM      Electronically signed by:  Bonita Chino MD 2021 10:55 AM

## 2021-12-13 ENCOUNTER — OFFICE VISIT (OUTPATIENT)
Dept: SURGERY | Age: 71
End: 2021-12-13

## 2021-12-13 VITALS
WEIGHT: 176.8 LBS | DIASTOLIC BLOOD PRESSURE: 62 MMHG | BODY MASS INDEX: 24.75 KG/M2 | RESPIRATION RATE: 20 BRPM | HEART RATE: 67 BPM | OXYGEN SATURATION: 92 % | SYSTOLIC BLOOD PRESSURE: 138 MMHG | TEMPERATURE: 97.3 F | HEIGHT: 71 IN

## 2021-12-13 DIAGNOSIS — Z90.49 S/P RIGHT COLECTOMY: Primary | ICD-10-CM

## 2021-12-13 PROCEDURE — 99024 POSTOP FOLLOW-UP VISIT: CPT | Performed by: NURSE PRACTITIONER

## 2021-12-13 NOTE — PROGRESS NOTES
[address] [place] [address] [address]ines[address] [facility] [address]  Dept: [phone]  Dept Fax: [phone]  Loc: [phone]    Visit Date: 12/13/2021    Serjio Mcguire is a 80 y.o. male who presents today for:  Chief Complaint   Patient presents with    Post-Op Check     s/p Robotic right colectomy 11-. Appointment with Dr. Kaleigh Srivastava 1/4/2022        HPI:     OPHELIA Alberto is a 77-year old male patient who presents for follow up status post robotic right colectomy 2 weeks ago with Dr. Reid Garcia for an unresectable flat sessile polyp. Pathology demonstrated invasive adenocarcinoma, moderately-differentiated. Appointment with oncology in January. Office already spoke with patient and Stage 1, no further treatment needed at this time but will follow. He is doing really well. Weaned off the narcotics. Denies any pain. Abdominal incisions are healing. Robotic sites are clean, dry and intact without signs of infection. Lower extraction site without issues. Appetite improving. No nausea or vomiting. Bowel function doing well. Going daily. No melena or hematochezia. No fevers or chills. No urinary complaints. No SOB or chest pain. No lightheadedness or dizziness. No calf pain or swelling. Back to normal activity. History reviewed. No pertinent past medical history.    Past Surgical History:   Procedure Laterality Date    COLONOSCOPY  08/26/2021    Dr. Severo Heather Right 11/30/2021    ROBOT RIGHT COLECTOMY performed by Sheila Cabral MD at [address] Right      2000 and 2009    SHOULDER ARTHROPLASTY Left     [facility], [address] TOTAL HIP ARTHROPLASTY  2016    [facility]       Family History   Problem Relation Age of Onset    Breast Cancer Mother        Social History     Tobacco Use    Smoking status: Former Smoker     Packs/day: 0.25     Years: 10.00     Pack years: 2.50     Types: Cigarettes     Quit date: 11/15/1992 Years since quittin.0    Smokeless tobacco: Never Used   Substance Use Topics    Alcohol use: Yes     Alcohol/week: 20.0 standard drinks     Types: 20 Standard drinks or equivalent per week     Comment: social      Current Outpatient Medications   Medication Sig Dispense Refill    Cholecalciferol (VITAMIN D3) 125 MCG (5000 UT) TABS Take by mouth      Ascorbic Acid (VITAMIN C) 250 MG tablet Take 500 mg by mouth daily      Zinc 100 MG TABS Take by mouth      meloxicam (MOBIC) 15 MG tablet Take 1 tablet by mouth daily 90 tablet 3     No current facility-administered medications for this visit. No Known Allergies    Subjective:     Review of Systems   Constitutional: Negative for activity change, appetite change, chills, diaphoresis, fatigue, fever and unexpected weight change. HENT: Negative for congestion, dental problem, hearing loss, rhinorrhea, sinus pressure and sore throat. Eyes: Negative for photophobia, pain, discharge, itching and visual disturbance. Respiratory: Negative for apnea, cough, choking, chest tightness, shortness of breath and wheezing. Cardiovascular: Negative for chest pain, palpitations and leg swelling. Gastrointestinal: Negative for abdominal distention, abdominal pain, anal bleeding, blood in stool, constipation, diarrhea, nausea and vomiting. Endocrine: Negative. Genitourinary: Negative for decreased urine volume, difficulty urinating, dysuria, frequency and urgency. Musculoskeletal: Negative for arthralgias, back pain, gait problem, joint swelling, myalgias and neck pain. Skin: Negative for color change, pallor, rash and wound. Allergic/Immunologic: Negative. Neurological: Negative for dizziness, tremors, weakness, numbness and headaches. Hematological: Negative. Psychiatric/Behavioral: Negative.         Objective:   /62 (Site: Left Upper Arm, Position: Sitting, Cuff Size: Medium Adult)   Pulse 67   Temp 97.3 °F (36.3 °C) (Temporal) 09/23/2021    HGB 13.7 (L) 12/02/2021    HCT 41.4 (L) 12/02/2021    MCV 93 09/23/2021     09/23/2021     Lab Results   Component Value Date     12/02/2021    K 3.8 12/02/2021    K 3.8 12/01/2021     12/02/2021    CO2 22 12/02/2021    BUN 9 12/02/2021    CREATININE 0.8 12/02/2021    GLUCOSE 105 12/02/2021    GLUCOSE 88 12/18/2019    CALCIUM 9.0 12/02/2021        PATHOLOGY REPORT     Clinical Information: COLON POLYP     ADDENDUM  12/6/21     FINAL DIAGNOSIS:   Colon, right hemicolectomy:             Invasive adenocarcinoma, moderately-differentiated, with   invasion into the       submucosa, 0.2 cm in greatest dimension, stage pT1 N0.    Margins negative for carcinoma.    Twelve lymph nodes, negative for carcinoma (0/12).  Melanosis coli.       Mild fibrous adhesions.       Acute appendicitis.    Unremarkable omentum.    Unremarkable small bowel mucosa. ADDENDUM 12/6/21   Immunohistochemistry Studies for Mismatch Repair Proteins   MLH1        Intact nuclear positivity, tumor cells   MSH2        Intact nuclear positivity, tumor cells   MSH6        Intact nuclear positivity, tumor cells   PMS2        Intact nuclear positivity, tumor cells     *Background nonneoplastic tissue/internal control with intact nuclear   expression. IHC Interpretation    No loss of nuclear expression of MMR proteins: low probability of   microsatellite instability-high (MSI-H)#      # - There are exceptions to the above IHC interpretations. Jyoti Amel   results should not be considered in isolation, and clinical correlation   with genetic counseling is recommended to assess the need for germline   testing.        Specimen:   RIGHT COLON       Gross Examination:   The container is labeled Jeni Clark, right colon.  Received fresh   is a segmental resection of bowel including terminal ileum, cecum, and   right colon.  The specimen measures 20 cm in length including 5 cm of   terminal ileum. Daniel Neither is an appendix measuring about 8 cm in length x   0.8 cm in diameter.  Sections through the appendix reveal thickened   mucosa.  The specimen is surrounded by a shaggy yellow adipose tissue. There is an attached sheet of omental fat. Narciso Mar is [place] ink   tattooing on the surface of the cecum.  The specimen is opened   longitudinally revealing a tan mucosal surface. Narciso Mar is an area of   mucosal thickening in the cecum measuring about 1.5 cm.  This area is   about 8 cm from the proximal resection line and 10.5 cm from the   mesenteric margin.  There are no large ulcerated or fungating masses. The attached sheet of omental fat measures 32 x 10 x about 2 cm.  No   discrete lesions.  Sections through the surrounding adipose tissue   reveal several tentatively identified lymph nodes.  Representative   sections are submitted.  Cassette #1 - proximal and distal resection   lines; cassette #2 - mesenteric margin; cassettes #3, #4, and #5 -   appendix; cassette #6 - area of mucosal thickening and cecum; cassette   #7 - uninvolved right colon; cassette #8 - omental fat; and cassettes   #9 and #10 - tentatively identified lymph nodes.  ss.    SIO/DKR:[id number]       Microscopic Examination:   Procedure   Right hemicolectomy     Tumor site   Cecum     Histologic type   Adenocarcinoma     Histologic grade   G2 moderately differentiated     Tumor size   Greatest dimension in centimeters: 0.2 cm   Additional dimensions: 0.2 cm     Tumor extent   Invades submucosa     Macroscopic tumor perforation   Not identified     Lymphovascular invasion   Not identified     Perineural invasion   Not identified     Number of tumor buds   Specify number and one hotspot field: 0   Tumor bud score   Low     Type of polyp in which invasive carcinoma arose   Tubular adenoma     Treatment effect   No known presurgical therapy     Margins   Margin status for invasive carcinoma   All margins negative for invasive carcinoma   Closest margin to invasive carcinoma: Proximal   Distance from invasive carcinoma to closest margin: 8 cm     Regional lymph nodes   Regional lymph nodes present   All regional lymph nodes negative for tumor   Number of lymph nodes examined   Exact number: 12     Tumor deposits   Not identified     Pathologic stage classification (pTMN) (AJCC 8th edition)   pT Category   pT1: Tumor invades the submucosa (through the muscularis mucosa but not   through the musculus propria)     pN Category   pN0: No regional lymph node metastases     Additional findings   Tubular adenoma with high-grade dysplasia   Melanosis coli   Fibrous adhesions   Acute appendicitis   Unremarkable omentum   Unremarkable small bowel mucosa     Ancillary testing   Immunohistochemistry for mismatch repair proteins will be performed on   block A6 and the results will be reported as an addendum. *This test was developed and its performance characteristics determined   by [address] [place] [address] has not been cleared or   approved by the U.S. Food and Drug Administration. Pursuant to the   requirements of CLIA, this laboratory has established and verified the   test's accuracy and precision.  Additional information about this type   of test is available upon request.     Patient Active Problem List   Diagnosis    Lower back pain    Elbow pain, right    Hip pain, left    Hip pain, right    Shoulder pain, left    Encounter for long-term (current) use of NSAIDs    Colon polyp     Assessment:     1. Status post robotic right colectomy  2. Invasive adenocarcinoma, moderately-differentiated    Plan:     1. Abdomen benign. Incisions are healing well without signs of infection. Continue wound care as directed. 2. Pathology reviewed again with patient. Oncology appointment beginning of Jan. No questions. Stage 1.   3. Appetite doing well. Continue diet as tolerated. High protein supplements encouraged. 4. Bowel function doing well. Stool softeners as needed.   5. Off

## 2021-12-14 RX ORDER — MELOXICAM 15 MG/1
15 TABLET ORAL DAILY
Qty: 90 TABLET | Refills: 3 | Status: SHIPPED | OUTPATIENT
Start: 2021-12-14 | End: 2022-07-11 | Stop reason: SDUPTHER

## 2021-12-14 ASSESSMENT — ENCOUNTER SYMPTOMS
ALLERGIC/IMMUNOLOGIC NEGATIVE: 1
BLOOD IN STOOL: 0
APNEA: 0
BACK PAIN: 0
COUGH: 0
SHORTNESS OF BREATH: 0
ABDOMINAL PAIN: 0
SORE THROAT: 0
CONSTIPATION: 0
PHOTOPHOBIA: 0
WHEEZING: 0
RHINORRHEA: 0
DIARRHEA: 0
EYE PAIN: 0
ANAL BLEEDING: 0
EYE DISCHARGE: 0
EYE ITCHING: 0
COLOR CHANGE: 0
CHEST TIGHTNESS: 0
VOMITING: 0
ABDOMINAL DISTENTION: 0
SINUS PRESSURE: 0
NAUSEA: 0
CHOKING: 0

## 2021-12-21 PROBLEM — C18.9 COLON ADENOCARCINOMA (HCC): Status: ACTIVE | Noted: 2021-12-21

## 2021-12-27 ENCOUNTER — OFFICE VISIT (OUTPATIENT)
Dept: SURGERY | Age: 71
End: 2021-12-27

## 2021-12-27 VITALS
DIASTOLIC BLOOD PRESSURE: 60 MMHG | TEMPERATURE: 95.3 F | HEIGHT: 71 IN | HEART RATE: 83 BPM | OXYGEN SATURATION: 98 % | BODY MASS INDEX: 25.17 KG/M2 | WEIGHT: 179.8 LBS | SYSTOLIC BLOOD PRESSURE: 130 MMHG | RESPIRATION RATE: 18 BRPM

## 2021-12-27 DIAGNOSIS — Z90.49 S/P RIGHT COLECTOMY: Primary | ICD-10-CM

## 2021-12-27 PROCEDURE — 99024 POSTOP FOLLOW-UP VISIT: CPT | Performed by: NURSE PRACTITIONER

## 2021-12-27 ASSESSMENT — ENCOUNTER SYMPTOMS
COLOR CHANGE: 0
ANAL BLEEDING: 0
VOMITING: 0
BLOOD IN STOOL: 0
CONSTIPATION: 0
RHINORRHEA: 0
APNEA: 0
SINUS PRESSURE: 0
EYE ITCHING: 0
ABDOMINAL PAIN: 0
ABDOMINAL DISTENTION: 0
CHEST TIGHTNESS: 0
WHEEZING: 0
SORE THROAT: 0
EYE DISCHARGE: 0
PHOTOPHOBIA: 0
CHOKING: 0
ALLERGIC/IMMUNOLOGIC NEGATIVE: 1
COUGH: 0
BACK PAIN: 0
DIARRHEA: 0
EYE PAIN: 0
SHORTNESS OF BREATH: 0
NAUSEA: 0

## 2021-12-27 NOTE — PROGRESS NOTES
[address] [place] [address] [address] [facility] [address]  Dept: [phone]  Dept Fax: [phone]  Loc: [phone]    Visit Date: 2021    Sohail Kelsey is a 80 y.o. male who presents today for:  Chief Complaint   Patient presents with    Post-Op Check     s/p Robotic right colectomy 2021-Appointment with Dr. Sheryle Kelly 2022-Last office visit 21       HPI:     HPI    Germaine Giraldo is a 77-year old male patient who presents for follow up status post robotic right colectomy 4 weeks ago with Dr. Bernadette Mcfarland for an unresectable flat sessile polyp. Pathology demonstrated invasive adenocarcinoma, moderately-differentiated. Appointment with oncology in January. Office already spoke with patient and Stage 1, no further treatment needed at this time but will follow. He is doing really well. Weaned off the narcotics. Denies any pain. Abdominal incisions are healing without any issues. Appetite normal. No nausea or vomiting. Bowel function doing well. Going daily. No melena or hematochezia. No fevers or chills. No urinary complaints. No SOB or chest pain. No lightheadedness or dizziness. No calf pain or swelling. Back to normal activity. History reviewed. No pertinent past medical history.    Past Surgical History:   Procedure Laterality Date    COLONOSCOPY  2021    Dr. Tate Sensing Right 2021    ROBOT RIGHT COLECTOMY performed by Terri Pedro MD at [address] [place] [address] Right       and     SHOULDER ARTHROPLASTY Left     [facility], [address] TOTAL HIP ARTHROPLASTY  2016    [facility]       Family History   Problem Relation Age of Onset    Breast Cancer Mother        Social History     Tobacco Use    Smoking status: Former Smoker     Packs/day: 0.25     Years: 10.00     Pack years: 2.50     Types: Cigarettes     Quit date: 11/15/1992     Years since quittin.1    Smokeless tobacco: Never Used Substance Use Topics    Alcohol use: Yes     Alcohol/week: 20.0 standard drinks     Types: 20 Standard drinks or equivalent per week     Comment: social      Current Outpatient Medications   Medication Sig Dispense Refill    meloxicam (MOBIC) 15 MG tablet Take 1 tablet by mouth daily 90 tablet 3    Cholecalciferol (VITAMIN D3) 125 MCG (5000 UT) TABS Take by mouth      Ascorbic Acid (VITAMIN C) 250 MG tablet Take 500 mg by mouth daily      Zinc 100 MG TABS Take by mouth       No current facility-administered medications for this visit. No Known Allergies    Subjective:     Review of Systems   Constitutional: Negative for activity change, appetite change, chills, diaphoresis, fatigue, fever and unexpected weight change. HENT: Negative for congestion, dental problem, hearing loss, rhinorrhea, sinus pressure and sore throat. Eyes: Negative for photophobia, pain, discharge, itching and visual disturbance. Respiratory: Negative for apnea, cough, choking, chest tightness, shortness of breath and wheezing. Cardiovascular: Negative for chest pain, palpitations and leg swelling. Gastrointestinal: Negative for abdominal distention, abdominal pain, anal bleeding, blood in stool, constipation, diarrhea, nausea and vomiting. Endocrine: Negative. Genitourinary: Negative for decreased urine volume, difficulty urinating, dysuria, frequency and urgency. Musculoskeletal: Negative for arthralgias, back pain, gait problem, joint swelling, myalgias and neck pain. Skin: Negative for color change, pallor, rash and wound. Allergic/Immunologic: Negative. Neurological: Negative for dizziness, tremors, weakness, numbness and headaches. Hematological: Negative. Psychiatric/Behavioral: Negative.         Objective:   /60 (Site: Right Upper Arm, Position: Sitting, Cuff Size: Medium Adult)   Pulse 83   Temp 95.3 °F (35.2 °C) (Temporal)   Resp 18   Ht 5' 11\" (1.803 m)   Wt 179 lb 12.8 oz (81.6 kg) SpO2 98%   BMI 25.08 kg/m²     Physical Exam  Vitals reviewed. Constitutional:       General: He is not in acute distress. Appearance: Normal appearance. He is well-developed. He is not ill-appearing or toxic-appearing. HENT:      Head: Normocephalic and atraumatic. Right Ear: Hearing and external ear normal.      Left Ear: Hearing and external ear normal.      Nose: Nose normal.      Mouth/Throat:      Mouth: Mucous membranes are not pale, not dry and not cyanotic. Eyes:      General: Lids are normal.   Neck:      Trachea: Trachea and phonation normal.   Cardiovascular:      Rate and Rhythm: Normal rate and regular rhythm. Pulses: Normal pulses. Heart sounds: S1 normal and S2 normal.   Pulmonary:      Effort: Pulmonary effort is normal. No tachypnea, bradypnea, accessory muscle usage or respiratory distress. Breath sounds: Normal breath sounds. No decreased breath sounds, wheezing or rales. Chest:      Chest wall: No tenderness. Abdominal:      General: Bowel sounds are normal. There is no distension. Palpations: Abdomen is soft. There is no mass. Tenderness: There is no abdominal tenderness. Musculoskeletal:         General: No tenderness. Normal range of motion. Cervical back: Normal range of motion and neck supple. Skin:     General: Skin is warm and dry. Findings: No abrasion, bruising, burn, ecchymosis, erythema, laceration, lesion or rash. Neurological:      Mental Status: He is alert and oriented to person, place, and time. Motor: No tremor, atrophy or abnormal muscle tone. Coordination: Coordination normal.      Gait: Gait normal.      Deep Tendon Reflexes: Reflexes are normal and symmetric. Psychiatric:         Speech: Speech normal.         Behavior: Behavior normal.         Thought Content:  Thought content normal.       PATHOLOGY REPORT     Clinical Information: COLON POLYP     ADDENDUM  12/6/21     FINAL DIAGNOSIS:   Colon, right hemicolectomy:             Invasive adenocarcinoma, moderately-differentiated, with   invasion into the       submucosa, 0.2 cm in greatest dimension, stage pT1 N0.    Margins negative for carcinoma.    Twelve lymph nodes, negative for carcinoma (0/12).  Melanosis coli.       Mild fibrous adhesions.       Acute appendicitis.    Unremarkable omentum.    Unremarkable small bowel mucosa. ADDENDUM 12/6/21   Immunohistochemistry Studies for Mismatch Repair Proteins   MLH1        Intact nuclear positivity, tumor cells   MSH2        Intact nuclear positivity, tumor cells   MSH6        Intact nuclear positivity, tumor cells   PMS2        Intact nuclear positivity, tumor cells     *Background nonneoplastic tissue/internal control with intact nuclear   expression. IHC Interpretation    No loss of nuclear expression of MMR proteins: low probability of   microsatellite instability-high (MSI-H)#      # - There are exceptions to the above IHC interpretations. Severo Heather   results should not be considered in isolation, and clinical correlation   with genetic counseling is recommended to assess the need for germline   testing. Specimen:   RIGHT COLON       Gross Examination:   The container is labeled Brooke Bryant, right colon.  Received fresh   is a segmental resection of bowel including terminal ileum, cecum, and   right colon.  The specimen measures 20 cm in length including 5 cm of   terminal ileum.  There is an appendix measuring about 8 cm in length x   0.8 cm in diameter.  Sections through the appendix reveal thickened   mucosa.  The specimen is surrounded by a shaggy yellow adipose tissue.    There is an attached sheet of omental fat. Beckey Short is [place] ink   tattooing on the surface of the cecum.  The specimen is opened   longitudinally revealing a tan mucosal surface. Beckey Short is an area of   mucosal thickening in the cecum measuring about 1.5 cm.  This area is   about 8 cm from the proximal resection line and 10.5 cm from the   mesenteric margin.  There are no large ulcerated or fungating masses. The attached sheet of omental fat measures 32 x 10 x about 2 cm.  No   discrete lesions.  Sections through the surrounding adipose tissue   reveal several tentatively identified lymph nodes.  Representative   sections are submitted.  Cassette #1 - proximal and distal resection   lines; cassette #2 - mesenteric margin; cassettes #3, #4, and #5 -   appendix; cassette #6 - area of mucosal thickening and cecum; cassette   #7 - uninvolved right colon; cassette #8 - omental fat; and cassettes   #9 and #10 - tentatively identified lymph nodes.  ss.    SIO/DKR:[id number]       Microscopic Examination:   Procedure   Right hemicolectomy     Tumor site   Cecum     Histologic type   Adenocarcinoma     Histologic grade   G2 moderately differentiated     Tumor size   Greatest dimension in centimeters: 0.2 cm   Additional dimensions: 0.2 cm     Tumor extent   Invades submucosa     Macroscopic tumor perforation   Not identified     Lymphovascular invasion   Not identified     Perineural invasion   Not identified     Number of tumor buds   Specify number and one hotspot field: 0   Tumor bud score   Low     Type of polyp in which invasive carcinoma arose   Tubular adenoma     Treatment effect   No known presurgical therapy     Margins   Margin status for invasive carcinoma   All margins negative for invasive carcinoma   Closest margin to invasive carcinoma: Proximal   Distance from invasive carcinoma to closest margin: 8 cm     Regional lymph nodes   Regional lymph nodes present   All regional lymph nodes negative for tumor   Number of lymph nodes examined   Exact number: 12     Tumor deposits   Not identified     Pathologic stage classification (pTMN) (AJCC 8th edition)   pT Category   pT1: Tumor invades the submucosa (through the muscularis mucosa but not   through the musculus propria)     pN Category   pN0: No regional lymph node metastases     Additional findings   Tubular adenoma with high-grade dysplasia   Melanosis coli   Fibrous adhesions   Acute appendicitis   Unremarkable omentum   Unremarkable small bowel mucosa     Ancillary testing   Immunohistochemistry for mismatch repair proteins will be performed on   block A6 and the results will be reported as an addendum. *This test was developed and its performance characteristics determined   by [address] [place] [address] Street has not been cleared or   approved by the U.S. Food and Drug Administration. Pursuant to the   requirements of CLIA, this laboratory has established and verified the   test's accuracy and precision.  Additional information about this type   of test is available upon request.     Patient Active Problem List   Diagnosis    Lower back pain    Elbow pain, right    Hip pain, left    Hip pain, right    Shoulder pain, left    Encounter for long-term (current) use of NSAIDs    Colon polyp    Colon adenocarcinoma (HCC)     Assessment:     1. Status post robotic right colectomy   2. Invasive adenocarcinoma, moderately-differentiated    Plan:     1. Abdomen benign. Incisions are healed well without issues. 2. Appetite and bowel function returned to normal   3. Off narcotics. Denies any pain. Tylenol as needed for discomfort. 4. Lifting/activity restrictions discussed with patient. Questions answered. Okay to start increasing a little for the next 2 weeks and then off restrictions at 6 weeks post op. 5. Follow up as needed. Signs and symptoms reviewed with patient that would be concerning and need him to return to office for re-evaluation. Patient states he will call if he has questions or concerns. 6. Follow up with oncology in January as already scheduled  7. Follow up with GI in 1 year for colonoscopy. Already scheduled.    8. Follow up with PCP as directed      Electronically signed by CLARISSA Tinajeor CNP on 12/27/2021 at 9:19 AM

## 2021-12-28 ENCOUNTER — PROCEDURE VISIT (OUTPATIENT)
Dept: FAMILY MEDICINE CLINIC | Age: 71
End: 2021-12-28
Payer: MEDICARE

## 2021-12-28 VITALS
WEIGHT: 181 LBS | SYSTOLIC BLOOD PRESSURE: 118 MMHG | BODY MASS INDEX: 25.24 KG/M2 | TEMPERATURE: 97.3 F | RESPIRATION RATE: 20 BRPM | DIASTOLIC BLOOD PRESSURE: 82 MMHG | HEART RATE: 86 BPM | OXYGEN SATURATION: 98 %

## 2021-12-28 DIAGNOSIS — M75.121 COMPLETE TEAR OF RIGHT ROTATOR CUFF, UNSPECIFIED WHETHER TRAUMATIC: ICD-10-CM

## 2021-12-28 DIAGNOSIS — M25.511 CHRONIC RIGHT SHOULDER PAIN: Primary | ICD-10-CM

## 2021-12-28 DIAGNOSIS — G89.29 CHRONIC RIGHT SHOULDER PAIN: Primary | ICD-10-CM

## 2021-12-28 PROCEDURE — G8417 CALC BMI ABV UP PARAM F/U: HCPCS | Performed by: NURSE PRACTITIONER

## 2021-12-28 PROCEDURE — 4040F PNEUMOC VAC/ADMIN/RCVD: CPT | Performed by: NURSE PRACTITIONER

## 2021-12-28 PROCEDURE — 1111F DSCHRG MED/CURRENT MED MERGE: CPT | Performed by: NURSE PRACTITIONER

## 2021-12-28 PROCEDURE — 99213 OFFICE O/P EST LOW 20 MIN: CPT | Performed by: NURSE PRACTITIONER

## 2021-12-28 PROCEDURE — 1036F TOBACCO NON-USER: CPT | Performed by: NURSE PRACTITIONER

## 2021-12-28 PROCEDURE — G8427 DOCREV CUR MEDS BY ELIG CLIN: HCPCS | Performed by: NURSE PRACTITIONER

## 2021-12-28 PROCEDURE — 3017F COLORECTAL CA SCREEN DOC REV: CPT | Performed by: NURSE PRACTITIONER

## 2021-12-28 PROCEDURE — 1123F ACP DISCUSS/DSCN MKR DOCD: CPT | Performed by: NURSE PRACTITIONER

## 2021-12-28 PROCEDURE — G8484 FLU IMMUNIZE NO ADMIN: HCPCS | Performed by: NURSE PRACTITIONER

## 2021-12-28 PROCEDURE — 20610 DRAIN/INJ JOINT/BURSA W/O US: CPT | Performed by: NURSE PRACTITIONER

## 2021-12-28 RX ORDER — TRIAMCINOLONE ACETONIDE 40 MG/ML
40 INJECTION, SUSPENSION INTRA-ARTICULAR; INTRAMUSCULAR ONCE
Status: COMPLETED | OUTPATIENT
Start: 2021-12-28 | End: 2021-12-28

## 2021-12-28 RX ADMIN — TRIAMCINOLONE ACETONIDE 40 MG: 40 INJECTION, SUSPENSION INTRA-ARTICULAR; INTRAMUSCULAR at 14:16

## 2021-12-28 ASSESSMENT — ENCOUNTER SYMPTOMS
STRIDOR: 0
SINUS PRESSURE: 0
SHORTNESS OF BREATH: 0
CHEST TIGHTNESS: 0
ABDOMINAL DISTENTION: 0
COLOR CHANGE: 0
WHEEZING: 0
ABDOMINAL PAIN: 0
COUGH: 0
CONSTIPATION: 0
DIARRHEA: 0
VOMITING: 0
BACK PAIN: 0
NAUSEA: 0
SORE THROAT: 0

## 2021-12-28 NOTE — PROGRESS NOTES
Juan C Washington (:  1950) is a 80 y.o. male,Established patient, here for evaluation of the following chief complaint(s):  Shoulder Pain (right shoulder)         ASSESSMENT/PLAN:  1. Chronic right shoulder pain  -     triamcinolone acetonide (KENALOG-40) injection 40 mg; 40 mg, Intra-artICUlar, ONCE, On 21 at 1445, For 1 dose  -     [place] ARTHROCENTESIS ASPIR&/INJ MAJOR JT/BURSA W/O US  2. Complete tear of right rotator cuff, unspecified whether traumatic  -     triamcinolone acetonide (KENALOG-40) injection 40 mg; 40 mg, Intra-artICUlar, ONCE, On 21 at 1445, For 1 dose  -     [place] ARTHROCENTESIS ASPIR&/INJ MAJOR JT/BURSA W/O US      After consent was obtained, using sterile technique the right shoulder was prepped and ethyl chloride was used to anesthetize the needle tract into the joint from the posterior approach. The shoulder joint was entered. Steroid kenalog 40 mg and 1 ml plain Lidocaine was then injected and the needle withdrawn. The procedure was well tolerated. The patient is asked to continue to rest the shoulder for a few more days before resuming regular activities. It may be more painful for the first 1-2 days. Watch for fever, or increased swelling or persistent pain in shoulder. Call or return to clinic prn if such symptoms occur or the shoulder fails to improve as anticipated. Return if symptoms worsen or fail to improve. Subjective   SUBJECTIVE/OBJECTIVE:  Shoulder Pain   Pertinent negatives include no fever or numbness. Right shoulder pain. Ongoing for years. Had MRI and showed rotator cuff tear. Seen ortho who recommend surgery or shoulder injections. Pt would like to wait to have surgery. Had injections in the past and been doing good. Review of Systems   Constitutional: Negative for activity change, appetite change, chills, diaphoresis, fatigue, fever and unexpected weight change.    HENT: Negative for congestion, ear pain, postnasal drip, sinus pressure and sore throat. Eyes: Negative for visual disturbance. Respiratory: Negative for cough, chest tightness, shortness of breath, wheezing and stridor. Cardiovascular: Negative for chest pain, palpitations and leg swelling. Gastrointestinal: Negative for abdominal distention, abdominal pain, constipation, diarrhea, nausea and vomiting. Endocrine: Negative for polydipsia, polyphagia and polyuria. Genitourinary: Negative for decreased urine volume, difficulty urinating, dysuria, flank pain, frequency, hematuria and urgency. Musculoskeletal: Positive for arthralgias. Negative for back pain, gait problem, joint swelling, myalgias and neck pain. Skin: Negative for color change, pallor and rash. Neurological: Negative for dizziness, syncope, weakness, light-headedness, numbness and headaches. Hematological: Negative for adenopathy. Psychiatric/Behavioral: Negative for behavioral problems, self-injury and sleep disturbance. The patient is not nervous/anxious. Objective   Physical Exam  Constitutional:       Appearance: Normal appearance. HENT:      Head: Normocephalic. Right Ear: Tympanic membrane normal.      Left Ear: Tympanic membrane normal.      Mouth/Throat:      Mouth: Mucous membranes are moist.      Pharynx: Oropharynx is clear. No oropharyngeal exudate or posterior oropharyngeal erythema. Cardiovascular:      Rate and Rhythm: Normal rate. Pulmonary:      Effort: Pulmonary effort is normal.   Musculoskeletal:         General: Tenderness present. No swelling, deformity or signs of injury. Skin:     General: Skin is warm and dry. Neurological:      Mental Status: He is alert and oriented to person, place, and time.             On this date 8/23/2021 I have spent 20 minutes reviewing previous notes, test results and face to face with the patient discussing the diagnosis and importance of compliance with the treatment plan as well as documenting on the day of the visit. An electronic signature was used to authenticate this note.     --Jared Yousif, CLARISSA - CNP

## 2022-04-25 ENCOUNTER — PROCEDURE VISIT (OUTPATIENT)
Dept: FAMILY MEDICINE CLINIC | Age: 72
End: 2022-04-25
Payer: MEDICARE

## 2022-04-25 VITALS
RESPIRATION RATE: 18 BRPM | HEIGHT: 71 IN | WEIGHT: 181.7 LBS | SYSTOLIC BLOOD PRESSURE: 126 MMHG | BODY MASS INDEX: 25.44 KG/M2 | DIASTOLIC BLOOD PRESSURE: 82 MMHG | HEART RATE: 88 BPM | OXYGEN SATURATION: 99 %

## 2022-04-25 DIAGNOSIS — M25.511 CHRONIC RIGHT SHOULDER PAIN: Primary | ICD-10-CM

## 2022-04-25 DIAGNOSIS — G89.29 CHRONIC RIGHT SHOULDER PAIN: Primary | ICD-10-CM

## 2022-04-25 DIAGNOSIS — M75.121 COMPLETE TEAR OF RIGHT ROTATOR CUFF, UNSPECIFIED WHETHER TRAUMATIC: ICD-10-CM

## 2022-04-25 PROCEDURE — G8427 DOCREV CUR MEDS BY ELIG CLIN: HCPCS | Performed by: NURSE PRACTITIONER

## 2022-04-25 PROCEDURE — 4040F PNEUMOC VAC/ADMIN/RCVD: CPT | Performed by: NURSE PRACTITIONER

## 2022-04-25 PROCEDURE — 1036F TOBACCO NON-USER: CPT | Performed by: NURSE PRACTITIONER

## 2022-04-25 PROCEDURE — 1123F ACP DISCUSS/DSCN MKR DOCD: CPT | Performed by: NURSE PRACTITIONER

## 2022-04-25 PROCEDURE — 20610 DRAIN/INJ JOINT/BURSA W/O US: CPT | Performed by: NURSE PRACTITIONER

## 2022-04-25 PROCEDURE — 99213 OFFICE O/P EST LOW 20 MIN: CPT | Performed by: NURSE PRACTITIONER

## 2022-04-25 PROCEDURE — 3017F COLORECTAL CA SCREEN DOC REV: CPT | Performed by: NURSE PRACTITIONER

## 2022-04-25 PROCEDURE — G8417 CALC BMI ABV UP PARAM F/U: HCPCS | Performed by: NURSE PRACTITIONER

## 2022-04-25 RX ORDER — TRIAMCINOLONE ACETONIDE 40 MG/ML
40 INJECTION, SUSPENSION INTRA-ARTICULAR; INTRAMUSCULAR ONCE
Status: COMPLETED | OUTPATIENT
Start: 2022-04-25 | End: 2022-04-25

## 2022-04-25 RX ADMIN — TRIAMCINOLONE ACETONIDE 40 MG: 40 INJECTION, SUSPENSION INTRA-ARTICULAR; INTRAMUSCULAR at 16:20

## 2022-04-25 ASSESSMENT — PATIENT HEALTH QUESTIONNAIRE - PHQ9
SUM OF ALL RESPONSES TO PHQ QUESTIONS 1-9: 0
SUM OF ALL RESPONSES TO PHQ9 QUESTIONS 1 & 2: 0
SUM OF ALL RESPONSES TO PHQ QUESTIONS 1-9: 0
SUM OF ALL RESPONSES TO PHQ QUESTIONS 1-9: 0
2. FEELING DOWN, DEPRESSED OR HOPELESS: 0
SUM OF ALL RESPONSES TO PHQ QUESTIONS 1-9: 0
1. LITTLE INTEREST OR PLEASURE IN DOING THINGS: 0

## 2022-04-25 NOTE — PROGRESS NOTES
Joaquin Girard (:  1950) is a 71 y.o. male,Established patient, here for evaluation of the following chief complaint(s):  Follow-up (shoulder injections )         ASSESSMENT/PLAN:  1. Chronic right shoulder pain  -     triamcinolone acetonide (KENALOG-40) injection 40 mg; 40 mg, Intra-artICUlar, ONCE, 1 dose, On 22 at 1645  -     [place] ARTHROCENTESIS ASPIR&/INJ MAJOR JT/BURSA W/O US  2. Complete tear of right rotator cuff, unspecified whether traumatic  -     triamcinolone acetonide (KENALOG-40) injection 40 mg; 40 mg, Intra-artICUlar, ONCE, 1 dose, On 22 at 1645  -     [place] ARTHROCENTESIS ASPIR&/INJ MAJOR JT/BURSA W/O US      After consent was obtained, using sterile technique the right shoulder was prepped and ethyl chloride was used to anesthetize the needle tract into the joint from the posterior approach. The shoulder joint was entered. Steroid Kenalog 40 mg and 1 ml plain Lidocaine was then injected and the needle withdrawn. The procedure was well tolerated. The patient is asked to continue to rest the shoulder for a few more days before resuming regular activities. It may be more painful for the first 1-2 days. Watch for fever, or increased swelling or persistent pain in shoulder. Call or return to clinic prn if such symptoms occur or the knee fails to improve as anticipated. otc pain relievers      Return if symptoms worsen or fail to improve. Subjective   SUBJECTIVE/OBJECTIVE:  HPI    Right shoulder pain for along time. Seen ortho and eventually will need shoulder replacement. Does do lots of golfing and worried wont be able to do that. Has been getting injections which help for 4 months most of the time. Would like one done today. Review of Systems   Musculoskeletal: Positive for arthralgias. Objective   Physical Exam  Constitutional:       Appearance: Normal appearance. Cardiovascular:      Rate and Rhythm: Normal rate.    Pulmonary: Effort: Pulmonary effort is normal.   Musculoskeletal:         General: Tenderness present. No swelling, deformity or signs of injury. Comments: Pain with rom. Skin:     General: Skin is warm and dry. Neurological:      Mental Status: He is alert and oriented to person, place, and time. On this date 4/25/2022 I have spent 25 minutes reviewing previous notes, test results and face to face with the patient discussing the diagnosis and importance of compliance with the treatment plan as well as documenting on the day of the visit. An electronic signature was used to authenticate this note.     --CLARISSA Mondragon - CNP

## 2022-04-25 NOTE — PROGRESS NOTES
Marce Yao (:  1950) is a 71 y.o. male,Established patient, here for evaluation of the following chief complaint(s):  Follow-up (shoulder injections )         ASSESSMENT/PLAN:  {There are no diagnoses linked to this encounter. (Refresh or delete this SmartLink)}    No follow-ups on file. Subjective   SUBJECTIVE/OBJECTIVE:  HPI        Review of Systems       Objective   Physical Exam       On this date 2022 I have spent 24 minutes reviewing previous notes, test results and face to face with the patient discussing the diagnosis and importance of compliance with the treatment plan as well as documenting on the day of the visit. An electronic signature was used to authenticate this note.     --CLARISSA Rosario - CNP

## 2022-07-11 RX ORDER — MELOXICAM 15 MG/1
15 TABLET ORAL DAILY
Qty: 90 TABLET | Refills: 3 | Status: SHIPPED | OUTPATIENT
Start: 2022-07-11

## 2022-07-11 NOTE — TELEPHONE ENCOUNTER
Patient called requesting a refill of His Mobic.        Last appointment this department: 4/25/2022  Next appointment this department: Visit date not found

## 2022-08-16 ENCOUNTER — OFFICE VISIT (OUTPATIENT)
Dept: FAMILY MEDICINE CLINIC | Age: 72
End: 2022-08-16
Payer: MEDICARE

## 2022-08-16 VITALS
WEIGHT: 183 LBS | SYSTOLIC BLOOD PRESSURE: 138 MMHG | RESPIRATION RATE: 22 BRPM | DIASTOLIC BLOOD PRESSURE: 82 MMHG | BODY MASS INDEX: 25.52 KG/M2 | HEART RATE: 74 BPM | OXYGEN SATURATION: 99 %

## 2022-08-16 DIAGNOSIS — M75.121 COMPLETE TEAR OF RIGHT ROTATOR CUFF, UNSPECIFIED WHETHER TRAUMATIC: ICD-10-CM

## 2022-08-16 DIAGNOSIS — C18.9 COLON ADENOCARCINOMA (HCC): ICD-10-CM

## 2022-08-16 DIAGNOSIS — G89.29 CHRONIC RIGHT SHOULDER PAIN: Primary | ICD-10-CM

## 2022-08-16 DIAGNOSIS — M25.511 CHRONIC RIGHT SHOULDER PAIN: Primary | ICD-10-CM

## 2022-08-16 PROCEDURE — G8417 CALC BMI ABV UP PARAM F/U: HCPCS | Performed by: NURSE PRACTITIONER

## 2022-08-16 PROCEDURE — 20610 DRAIN/INJ JOINT/BURSA W/O US: CPT | Performed by: NURSE PRACTITIONER

## 2022-08-16 PROCEDURE — 1123F ACP DISCUSS/DSCN MKR DOCD: CPT | Performed by: NURSE PRACTITIONER

## 2022-08-16 PROCEDURE — 99213 OFFICE O/P EST LOW 20 MIN: CPT | Performed by: NURSE PRACTITIONER

## 2022-08-16 PROCEDURE — G8427 DOCREV CUR MEDS BY ELIG CLIN: HCPCS | Performed by: NURSE PRACTITIONER

## 2022-08-16 PROCEDURE — 3017F COLORECTAL CA SCREEN DOC REV: CPT | Performed by: NURSE PRACTITIONER

## 2022-08-16 PROCEDURE — 1036F TOBACCO NON-USER: CPT | Performed by: NURSE PRACTITIONER

## 2022-08-16 RX ORDER — TRIAMCINOLONE ACETONIDE 40 MG/ML
40 INJECTION, SUSPENSION INTRA-ARTICULAR; INTRAMUSCULAR ONCE
Status: COMPLETED | OUTPATIENT
Start: 2022-08-16 | End: 2022-08-16

## 2022-08-16 RX ADMIN — TRIAMCINOLONE ACETONIDE 40 MG: 40 INJECTION, SUSPENSION INTRA-ARTICULAR; INTRAMUSCULAR at 14:43

## 2022-08-16 NOTE — PROGRESS NOTES
fever and unexpected weight change. HENT:  Negative for congestion, ear pain, postnasal drip, sinus pressure and sore throat. Eyes:  Negative for visual disturbance. Respiratory:  Negative for cough, chest tightness, shortness of breath, wheezing and stridor. Cardiovascular:  Negative for chest pain, palpitations and leg swelling. Gastrointestinal:  Negative for abdominal distention, abdominal pain, constipation, diarrhea, nausea and vomiting. Endocrine: Negative for polydipsia, polyphagia and polyuria. Genitourinary:  Negative for decreased urine volume, difficulty urinating, dysuria, flank pain, frequency, hematuria and urgency. Musculoskeletal:  Positive for arthralgias. Negative for back pain, gait problem, joint swelling, myalgias and neck pain. Skin:  Negative for color change, pallor and rash. Neurological:  Negative for dizziness, syncope, weakness, light-headedness, numbness and headaches. Hematological:  Negative for adenopathy. Psychiatric/Behavioral:  Negative for behavioral problems, self-injury and sleep disturbance. The patient is not nervous/anxious. Objective   Physical Exam  Constitutional:       Appearance: Normal appearance. HENT:      Head: Normocephalic and atraumatic. Cardiovascular:      Rate and Rhythm: Normal rate. Pulmonary:      Effort: Pulmonary effort is normal.   Abdominal:      Tenderness: There is no abdominal tenderness. Musculoskeletal:         General: Tenderness present. No swelling, deformity or signs of injury. Normal range of motion. Skin:     General: Skin is warm and dry. Neurological:      Mental Status: He is alert and oriented to person, place, and time. On this date 8/16/2022 I have spent 22 minutes reviewing previous notes, test results and face to face with the patient discussing the diagnosis and importance of compliance with the treatment plan as well as documenting on the day of the visit.       An electronic signature was used to authenticate this note.     --Keny Silence, APRN - CNP

## 2022-08-23 ASSESSMENT — ENCOUNTER SYMPTOMS
BACK PAIN: 0
SINUS PRESSURE: 0
NAUSEA: 0
WHEEZING: 0
DIARRHEA: 0
STRIDOR: 0
ABDOMINAL DISTENTION: 0
SORE THROAT: 0
ABDOMINAL PAIN: 0
COUGH: 0
VOMITING: 0
CHEST TIGHTNESS: 0
SHORTNESS OF BREATH: 0
COLOR CHANGE: 0
CONSTIPATION: 0

## 2022-10-24 ENCOUNTER — OFFICE VISIT (OUTPATIENT)
Dept: FAMILY MEDICINE CLINIC | Age: 72
End: 2022-10-24
Payer: MEDICARE

## 2022-10-24 VITALS
HEART RATE: 79 BPM | RESPIRATION RATE: 22 BRPM | SYSTOLIC BLOOD PRESSURE: 132 MMHG | HEIGHT: 71 IN | WEIGHT: 185 LBS | OXYGEN SATURATION: 96 % | BODY MASS INDEX: 25.9 KG/M2 | DIASTOLIC BLOOD PRESSURE: 76 MMHG

## 2022-10-24 DIAGNOSIS — T63.441A BEE STING, ACCIDENTAL OR UNINTENTIONAL, INITIAL ENCOUNTER: ICD-10-CM

## 2022-10-24 DIAGNOSIS — L03.114 CELLULITIS OF LEFT UPPER EXTREMITY: Primary | ICD-10-CM

## 2022-10-24 PROBLEM — M19.012 ARTHRITIS OF LEFT SHOULDER REGION: Status: ACTIVE | Noted: 2020-12-02

## 2022-10-24 PROBLEM — M12.811 ROTATOR CUFF ARTHROPATHY OF RIGHT SHOULDER: Status: ACTIVE | Noted: 2021-11-16

## 2022-10-24 PROCEDURE — G8417 CALC BMI ABV UP PARAM F/U: HCPCS | Performed by: NURSE PRACTITIONER

## 2022-10-24 PROCEDURE — G8427 DOCREV CUR MEDS BY ELIG CLIN: HCPCS | Performed by: NURSE PRACTITIONER

## 2022-10-24 PROCEDURE — 1123F ACP DISCUSS/DSCN MKR DOCD: CPT | Performed by: NURSE PRACTITIONER

## 2022-10-24 PROCEDURE — G8484 FLU IMMUNIZE NO ADMIN: HCPCS | Performed by: NURSE PRACTITIONER

## 2022-10-24 PROCEDURE — 99213 OFFICE O/P EST LOW 20 MIN: CPT | Performed by: NURSE PRACTITIONER

## 2022-10-24 PROCEDURE — 1036F TOBACCO NON-USER: CPT | Performed by: NURSE PRACTITIONER

## 2022-10-24 PROCEDURE — 3017F COLORECTAL CA SCREEN DOC REV: CPT | Performed by: NURSE PRACTITIONER

## 2022-10-24 RX ORDER — PREDNISONE 20 MG/1
20 TABLET ORAL 2 TIMES DAILY
Qty: 10 TABLET | Refills: 0 | Status: SHIPPED | OUTPATIENT
Start: 2022-10-24 | End: 2022-10-29

## 2022-10-24 RX ORDER — CEPHALEXIN 500 MG/1
500 CAPSULE ORAL 3 TIMES DAILY
Qty: 30 CAPSULE | Refills: 0 | Status: SHIPPED | OUTPATIENT
Start: 2022-10-24

## 2022-10-24 SDOH — ECONOMIC STABILITY: FOOD INSECURITY: WITHIN THE PAST 12 MONTHS, THE FOOD YOU BOUGHT JUST DIDN'T LAST AND YOU DIDN'T HAVE MONEY TO GET MORE.: NEVER TRUE

## 2022-10-24 SDOH — ECONOMIC STABILITY: FOOD INSECURITY: WITHIN THE PAST 12 MONTHS, YOU WORRIED THAT YOUR FOOD WOULD RUN OUT BEFORE YOU GOT MONEY TO BUY MORE.: NEVER TRUE

## 2022-10-24 ASSESSMENT — SOCIAL DETERMINANTS OF HEALTH (SDOH): HOW HARD IS IT FOR YOU TO PAY FOR THE VERY BASICS LIKE FOOD, HOUSING, MEDICAL CARE, AND HEATING?: NOT HARD AT ALL

## 2022-10-24 NOTE — PROGRESS NOTES
Theodora Faria (:  1950) is a 71 y.o. male,Established patient, here for evaluation of the following chief complaint(s): Other (Bee sting-  left wrist going up his arm)         ASSESSMENT/PLAN:  1. Cellulitis of left upper extremity  2. Bee sting, accidental or unintentional, initial encounter    Keflex as prescribed  Prednisone as prescribed  Ice to area  Tylenol for discomfort    Return if symptoms worsen or fail to improve. Subjective   SUBJECTIVE/OBJECTIVE:  HPI    Stung by bee on Friday. Hurts and is very swollen. Doesn't usually have problems with stings. This time its bad. Was racking leaves in yard when stung. Put some silvershield cream on it. Review of Systems   Skin:  Positive for rash. Objective   Physical Exam  Musculoskeletal:         General: No swelling or tenderness. Normal range of motion. Skin:     General: Skin is warm and dry. Findings: Erythema present. Comments: Redness and swelling from wrist to elbow. Painful. On this date 10/24/2022 I have spent 24 minutes reviewing previous notes, test results and face to face with the patient discussing the diagnosis and importance of compliance with the treatment plan as well as documenting on the day of the visit. An electronic signature was used to authenticate this note.     --Zoë Dee, CLARISSA - CNP

## 2022-12-07 ENCOUNTER — PROCEDURE VISIT (OUTPATIENT)
Dept: FAMILY MEDICINE CLINIC | Age: 72
End: 2022-12-07

## 2022-12-07 VITALS
HEART RATE: 74 BPM | DIASTOLIC BLOOD PRESSURE: 72 MMHG | RESPIRATION RATE: 20 BRPM | OXYGEN SATURATION: 97 % | SYSTOLIC BLOOD PRESSURE: 124 MMHG | WEIGHT: 185 LBS | BODY MASS INDEX: 25.8 KG/M2

## 2022-12-07 DIAGNOSIS — G89.29 CHRONIC RIGHT SHOULDER PAIN: Primary | ICD-10-CM

## 2022-12-07 DIAGNOSIS — M75.121 COMPLETE TEAR OF RIGHT ROTATOR CUFF, UNSPECIFIED WHETHER TRAUMATIC: ICD-10-CM

## 2022-12-07 DIAGNOSIS — M25.511 CHRONIC RIGHT SHOULDER PAIN: Primary | ICD-10-CM

## 2022-12-07 RX ORDER — TRIAMCINOLONE ACETONIDE 40 MG/ML
40 INJECTION, SUSPENSION INTRA-ARTICULAR; INTRAMUSCULAR ONCE
Status: COMPLETED | OUTPATIENT
Start: 2022-12-07 | End: 2022-12-07

## 2022-12-07 RX ADMIN — TRIAMCINOLONE ACETONIDE 40 MG: 40 INJECTION, SUSPENSION INTRA-ARTICULAR; INTRAMUSCULAR at 15:52

## 2022-12-07 NOTE — PROGRESS NOTES
Annette Mcfarland (:  1950) is a 71 y.o. male,Established patient, here for evaluation of the following chief complaint(s):  Shoulder Pain (Right shoulder injection)         ASSESSMENT/PLAN:  1. Chronic right shoulder pain  -     triamcinolone acetonide (KENALOG-40) injection 40 mg; 40 mg, Intra-artICUlar, ONCE, 1 dose, On 22 at 1600  -     [place] ARTHROCENTESIS ASPIR&/INJ MAJOR JT/BURSA W/O US  2. Complete tear of right rotator cuff, unspecified whether traumatic  -     triamcinolone acetonide (KENALOG-40) injection 40 mg; 40 mg, Intra-artICUlar, ONCE, 1 dose, On 22 at 1600  -     [place] ARTHROCENTESIS ASPIR&/INJ MAJOR JT/BURSA W/O US    After consent was obtained, using sterile technique the right shoulder was prepped and ethyl chloride was used to anesthetize the needle tract into the joint from the posterior approach. The shoulder joint was entered. Steroid Kenalog 40 mg and 1 ml plain Lidocaine was then injected and the needle withdrawn. The procedure was well tolerated. The patient is asked to continue to rest the shoulder for a few more days before resuming regular activities. It may be more painful for the first 1-2 days. Watch for fever, or increased swelling or persistent pain in shoulder. Call or return to clinic prn if such symptoms occur or the knee fails to improve as anticipated. Return if symptoms worsen or fail to improve. Subjective   SUBJECTIVE/OBJECTIVE:  HPI    Right shoulder pain. On going for many years. Getting injections and does help for 3-4 months. Seen ortho and is getting replacement next year. Does have complete tear of rotator cuff. Review of Systems   Musculoskeletal:  Positive for arthralgias. Objective   Physical Exam  Musculoskeletal:         General: Tenderness present. No swelling, deformity or signs of injury.           On this date 2022 I have spent 23 minutes reviewing previous notes, test results and face to face with the patient discussing the diagnosis and importance of compliance with the treatment plan as well as documenting on the day of the visit. An electronic signature was used to authenticate this note.     --CLARISSA Infante - CNP

## 2022-12-21 RX ORDER — MELOXICAM 15 MG/1
15 TABLET ORAL DAILY
Qty: 90 TABLET | Refills: 3 | Status: SHIPPED | OUTPATIENT
Start: 2022-12-21

## 2022-12-21 NOTE — TELEPHONE ENCOUNTER
Patient called requesting a refill of his meloxicam.     Last appointment this department: 12/7/2022  Next appointment this department: Visit date not found

## 2023-02-08 ENCOUNTER — HOSPITAL ENCOUNTER (OUTPATIENT)
Dept: CT IMAGING | Age: 73
Discharge: HOME | End: 2023-02-08
Payer: MEDICARE

## 2023-02-08 DIAGNOSIS — M25.511 RIGHT SHOULDER PAIN, UNSPECIFIED CHRONICITY: ICD-10-CM

## 2023-02-08 DIAGNOSIS — M75.102 ROTATOR CUFF TEAR ARTHROPATHY OF BOTH SHOULDERS: ICD-10-CM

## 2023-02-08 DIAGNOSIS — M12.811 ROTATOR CUFF TEAR ARTHROPATHY OF BOTH SHOULDERS: ICD-10-CM

## 2023-02-08 DIAGNOSIS — M12.812 ROTATOR CUFF TEAR ARTHROPATHY OF BOTH SHOULDERS: ICD-10-CM

## 2023-02-08 DIAGNOSIS — M75.101 ROTATOR CUFF TEAR ARTHROPATHY OF BOTH SHOULDERS: ICD-10-CM

## 2023-02-08 PROCEDURE — 73200 CT UPPER EXTREMITY W/O DYE: CPT

## 2023-04-05 ENCOUNTER — OFFICE VISIT (OUTPATIENT)
Dept: FAMILY MEDICINE CLINIC | Age: 73
End: 2023-04-05

## 2023-04-05 VITALS
HEART RATE: 72 BPM | RESPIRATION RATE: 18 BRPM | HEIGHT: 71 IN | WEIGHT: 185 LBS | OXYGEN SATURATION: 98 % | BODY MASS INDEX: 25.9 KG/M2

## 2023-04-05 DIAGNOSIS — M25.511 CHRONIC RIGHT SHOULDER PAIN: Primary | ICD-10-CM

## 2023-04-05 DIAGNOSIS — G89.29 CHRONIC RIGHT SHOULDER PAIN: Primary | ICD-10-CM

## 2023-04-05 RX ORDER — TRIAMCINOLONE ACETONIDE 40 MG/ML
40 INJECTION, SUSPENSION INTRA-ARTICULAR; INTRAMUSCULAR ONCE
Status: COMPLETED | OUTPATIENT
Start: 2023-04-05 | End: 2023-04-05

## 2023-04-05 RX ADMIN — TRIAMCINOLONE ACETONIDE 40 MG: 40 INJECTION, SUSPENSION INTRA-ARTICULAR; INTRAMUSCULAR at 09:20

## 2023-04-05 SDOH — ECONOMIC STABILITY: INCOME INSECURITY: HOW HARD IS IT FOR YOU TO PAY FOR THE VERY BASICS LIKE FOOD, HOUSING, MEDICAL CARE, AND HEATING?: NOT HARD AT ALL

## 2023-04-05 SDOH — ECONOMIC STABILITY: FOOD INSECURITY: WITHIN THE PAST 12 MONTHS, THE FOOD YOU BOUGHT JUST DIDN'T LAST AND YOU DIDN'T HAVE MONEY TO GET MORE.: NEVER TRUE

## 2023-04-05 SDOH — ECONOMIC STABILITY: HOUSING INSECURITY
IN THE LAST 12 MONTHS, WAS THERE A TIME WHEN YOU DID NOT HAVE A STEADY PLACE TO SLEEP OR SLEPT IN A SHELTER (INCLUDING NOW)?: NO

## 2023-04-05 SDOH — ECONOMIC STABILITY: FOOD INSECURITY: WITHIN THE PAST 12 MONTHS, YOU WORRIED THAT YOUR FOOD WOULD RUN OUT BEFORE YOU GOT MONEY TO BUY MORE.: NEVER TRUE

## 2023-04-05 ASSESSMENT — ENCOUNTER SYMPTOMS
VOMITING: 0
BACK PAIN: 0
SHORTNESS OF BREATH: 0
STRIDOR: 0
CHEST TIGHTNESS: 0
ABDOMINAL PAIN: 0
WHEEZING: 0
SORE THROAT: 0
DIARRHEA: 0
CONSTIPATION: 0
COLOR CHANGE: 0
NAUSEA: 0
COUGH: 0
SINUS PRESSURE: 0
ABDOMINAL DISTENTION: 0

## 2023-04-05 ASSESSMENT — PATIENT HEALTH QUESTIONNAIRE - PHQ9
1. LITTLE INTEREST OR PLEASURE IN DOING THINGS: 0
SUM OF ALL RESPONSES TO PHQ QUESTIONS 1-9: 0
SUM OF ALL RESPONSES TO PHQ QUESTIONS 1-9: 0
2. FEELING DOWN, DEPRESSED OR HOPELESS: 0
SUM OF ALL RESPONSES TO PHQ9 QUESTIONS 1 & 2: 0
SUM OF ALL RESPONSES TO PHQ QUESTIONS 1-9: 0
SUM OF ALL RESPONSES TO PHQ QUESTIONS 1-9: 0

## 2023-04-05 ASSESSMENT — LIFESTYLE VARIABLES
HOW OFTEN DO YOU HAVE A DRINK CONTAINING ALCOHOL: PATIENT DECLINED
HOW MANY STANDARD DRINKS CONTAINING ALCOHOL DO YOU HAVE ON A TYPICAL DAY: PATIENT DECLINED

## 2023-04-05 NOTE — PROGRESS NOTES
and vomiting. Endocrine: Negative for polydipsia, polyphagia and polyuria. Genitourinary:  Negative for decreased urine volume, difficulty urinating, dysuria, flank pain, frequency, hematuria and urgency. Musculoskeletal:  Positive for arthralgias. Negative for back pain, gait problem, joint swelling, myalgias and neck pain. Skin:  Negative for color change, pallor and rash. Neurological:  Negative for dizziness, syncope, weakness, light-headedness, numbness and headaches. Hematological:  Negative for adenopathy. Psychiatric/Behavioral:  Negative for behavioral problems, self-injury and sleep disturbance. The patient is not nervous/anxious. Objective   Physical Exam  Vitals reviewed. Constitutional:       General: He is not in acute distress. Appearance: Normal appearance. He is well-developed. HENT:      Head: Normocephalic. Right Ear: External ear normal.      Left Ear: External ear normal.      Nose: Nose normal.      Right Sinus: No maxillary sinus tenderness. Left Sinus: No maxillary sinus tenderness. Mouth/Throat:      Pharynx: Uvula midline. Neck:      Trachea: Trachea normal.   Cardiovascular:      Rate and Rhythm: Normal rate and regular rhythm. Heart sounds: Normal heart sounds. No murmur heard. Pulmonary:      Effort: Pulmonary effort is normal. No respiratory distress. Breath sounds: Normal breath sounds. No decreased breath sounds, wheezing, rhonchi or rales. Chest:      Chest wall: No tenderness. Abdominal:      General: There is no distension. Palpations: Abdomen is soft. There is no mass. Tenderness: There is no abdominal tenderness. Musculoskeletal:         General: Tenderness present. No swelling, deformity or signs of injury. Normal range of motion. Cervical back: Normal range of motion and neck supple. Lymphadenopathy:      Cervical: No cervical adenopathy. Skin:     General: Skin is warm and dry.    Neurological:

## 2023-07-06 ENCOUNTER — PROCEDURE VISIT (OUTPATIENT)
Dept: FAMILY MEDICINE CLINIC | Age: 73
End: 2023-07-06

## 2023-07-06 VITALS
SYSTOLIC BLOOD PRESSURE: 130 MMHG | BODY MASS INDEX: 24.13 KG/M2 | HEART RATE: 63 BPM | WEIGHT: 173 LBS | RESPIRATION RATE: 18 BRPM | DIASTOLIC BLOOD PRESSURE: 78 MMHG | OXYGEN SATURATION: 99 %

## 2023-07-06 DIAGNOSIS — M25.511 CHRONIC RIGHT SHOULDER PAIN: Primary | ICD-10-CM

## 2023-07-06 DIAGNOSIS — G89.29 CHRONIC RIGHT SHOULDER PAIN: Primary | ICD-10-CM

## 2023-07-06 DIAGNOSIS — M75.121 COMPLETE TEAR OF RIGHT ROTATOR CUFF, UNSPECIFIED WHETHER TRAUMATIC: ICD-10-CM

## 2023-07-06 DIAGNOSIS — C18.9 COLON ADENOCARCINOMA (HCC): ICD-10-CM

## 2023-07-06 RX ORDER — TRIAMCINOLONE ACETONIDE 40 MG/ML
40 INJECTION, SUSPENSION INTRA-ARTICULAR; INTRAMUSCULAR ONCE
Status: COMPLETED | OUTPATIENT
Start: 2023-07-06 | End: 2023-07-06

## 2023-07-06 RX ADMIN — TRIAMCINOLONE ACETONIDE 40 MG: 40 INJECTION, SUSPENSION INTRA-ARTICULAR; INTRAMUSCULAR at 14:17

## 2023-07-06 ASSESSMENT — ENCOUNTER SYMPTOMS
WHEEZING: 0
BACK PAIN: 0
COLOR CHANGE: 0
NAUSEA: 0
CHEST TIGHTNESS: 0
SORE THROAT: 0
ABDOMINAL DISTENTION: 0
SHORTNESS OF BREATH: 0
DIARRHEA: 0
VOMITING: 0
COUGH: 0
SINUS PRESSURE: 0
CONSTIPATION: 0
ABDOMINAL PAIN: 0
STRIDOR: 0

## 2023-07-06 NOTE — PROGRESS NOTES
Axel Wang (:  1950) is a 68 y.o. male,Established patient, here for evaluation of the following chief complaint(s):  Shoulder Pain (Right shoulder pain )         ASSESSMENT/PLAN:  1. Chronic right shoulder pain  -     triamcinolone acetonide (KENALOG-40) injection 40 mg; 40 mg, Intra-artICUlar, ONCE, 1 dose, On Thu 23 at 1430  -     [place] ARTHROCENTESIS ASPIR&/INJ MAJOR JT/BURSA W/O US  2. Complete tear of right rotator cuff, unspecified whether traumatic  -     triamcinolone acetonide (KENALOG-40) injection 40 mg; 40 mg, Intra-artICUlar, ONCE, 1 dose, On Thu 23 at 1430  -     [place] ARTHROCENTESIS ASPIR&/INJ MAJOR JT/BURSA W/O US  3. Colon adenocarcinoma ([address])    After consent was obtained, using sterile technique the right shoulder was prepped and ethyl chloride was used to anesthetize the needle tract into the joint from the posterior approach. The shoulder joint was entered. Steroid Kenalog 40 mg and 1 ml plain Lidocaine was then injected and the needle withdrawn. The procedure was well tolerated. The patient is asked to continue to rest the shoulder for a few more days before resuming regular activities. It may be more painful for the first 1-2 days. Watch for fever, or increased swelling or persistent pain in shoulder. Call or return to clinic prn if such symptoms occur or the knee fails to improve as anticipated. Colon cancer resolved after surgery    Return if symptoms worsen or fail to improve. Subjective   SUBJECTIVE/OBJECTIVE:    Chonic right shoulder pain. Has torn rotator cuff. Is planning on surgery later this year. Pain moderate at times. Hard to move shoulder. Injections in the past have helped. Shoulder Pain   Pertinent negatives include no fever or numbness. Review of Systems   Constitutional:  Negative for activity change, appetite change, chills, diaphoresis, fatigue, fever and unexpected weight change.    HENT:  Negative for congestion, ear pain,

## 2024-12-23 DIAGNOSIS — M25.512 ACUTE PAIN OF LEFT SHOULDER: ICD-10-CM

## 2024-12-23 RX ORDER — MELOXICAM 15 MG/1
15 TABLET ORAL DAILY
Qty: 90 TABLET | Refills: 3 | Status: SHIPPED | OUTPATIENT
Start: 2024-12-23

## 2024-12-23 NOTE — TELEPHONE ENCOUNTER
Aj Allen called requesting a refill on the following medications:  Requested Prescriptions     Pending Prescriptions Disp Refills    meloxicam (MOBIC) 15 MG tablet 90 tablet 3     Sig: Take 1 tablet by mouth daily       Date of last visit: 7/6/2023  Date of next visit (if applicable):Visit date not found  Date of last refill: 12/18/23  Pharmacy Name: Huong Solorzano LPN

## (undated) DEVICE — SOLUTION ANTIFOG VIS SYS CLEARIFY LAPSCP

## (undated) DEVICE — Z DUPLICATE USE 2517136 APPLICATOR LAP 45 CM 2 FLX VISTASEAL

## (undated) DEVICE — ARM DRAPE

## (undated) DEVICE — BLADELESS OBTURATOR: Brand: WECK VISTA

## (undated) DEVICE — SUTURE NOVAFIL SZ 1 L30IN NONABSORBABLE BLU GS-25 1/2 CIR 8886446571

## (undated) DEVICE — TROCAR: Brand: KII SHIELDED BLADED ACCESS SYSTEM

## (undated) DEVICE — TIP COVER ACCESSORY

## (undated) DEVICE — WOUND RETRACTOR AND PROTECTOR: Brand: ALEXIS O WOUND PROTECTOR-RETRACTOR

## (undated) DEVICE — TROCAR: Brand: KII FIOS FIRST ENTRY

## (undated) DEVICE — SEALANT TISS 10 CC FIBRIN VISTASEAL

## (undated) DEVICE — RELOAD STPL L60MM H1.5-3.6MM REG TISS BLU GRIPPING SURF B

## (undated) DEVICE — CANNULA SEAL

## (undated) DEVICE — GLOVE ORANGE PI 8   MSG9080

## (undated) DEVICE — BANDAGE ADH W1XL3IN NAT FAB WVN FLX DURABLE N ADH PD SEAL

## (undated) DEVICE — SPONGE LAP W18XL18IN WHT COT 4 PLY FLD STRUNG RADPQ DISP ST

## (undated) DEVICE — BINDER ABD UNISX 4 PNL PREM 6INX6INX12IN L XL 4

## (undated) DEVICE — SUTURE VCRL SZ 2-0 L27IN ABSRB UD L26MM SH 1/2 CIR J417H

## (undated) DEVICE — SUTURE VCRL + SZ 3-0 L27IN ABSRB UD L26MM SH 1/2 CIR VCP416H

## (undated) DEVICE — INSUFFLATION NEEDLE TO ESTABLISH PNEUMOPERITONEUM.: Brand: INSUFFLATION NEEDLE

## (undated) DEVICE — BASIC SINGLE BASIN BTC-LF: Brand: MEDLINE INDUSTRIES, INC.

## (undated) DEVICE — GLOVE ORANGE PI 7   MSG9070

## (undated) DEVICE — 35 ML SYRINGE LUER-LOCK TIP: Brand: MONOJECT

## (undated) DEVICE — ELECTRO LUBE IS A SINGLE PATIENT USE DEVICE THAT IS INTENDED TO BE USED ON ELECTROSURGICAL ELECTRODES TO REDUCE STICKING.: Brand: KEY SURGICAL ELECTRO LUBE

## (undated) DEVICE — COLUMN DRAPE

## (undated) DEVICE — STAPLER SKIN L440MM 32MM LNG 12 FIRING B FRM PWR + GRIPPING

## (undated) DEVICE — GOWN,SIRUS,NON REINFRCD,LARGE,SET IN SL: Brand: MEDLINE

## (undated) DEVICE — TUBING INSUFFLATOR HEAT HUMIDIFIED SMK EVAC SET PNEUMOCLEAR

## (undated) DEVICE — SUTURE V-LOC 180 SZ 3-0 L9IN ABSRB GRN L26MM V-20 1/2 CIR VLOCL0644

## (undated) DEVICE — PUMP SUC IRR TBNG L10FT W/ HNDPC ASSEMB STRYKEFLOW 2

## (undated) DEVICE — SUTURE VCRL + SZ 0 L18IN ABSRB TIE VCP106G

## (undated) DEVICE — TTL1LYR 16FR10ML 100%SIL TMPST TR: Brand: MEDLINE

## (undated) DEVICE — VESSEL SEALER EXTEND: Brand: ENDOWRIST

## (undated) DEVICE — GENERAL LAPAROSCOPY PACK-LF: Brand: MEDLINE INDUSTRIES, INC.